# Patient Record
Sex: FEMALE | Race: WHITE | NOT HISPANIC OR LATINO | Employment: UNEMPLOYED | URBAN - METROPOLITAN AREA
[De-identification: names, ages, dates, MRNs, and addresses within clinical notes are randomized per-mention and may not be internally consistent; named-entity substitution may affect disease eponyms.]

---

## 2017-06-12 ENCOUNTER — TELEPHONE (OUTPATIENT)
Dept: FAMILY MEDICINE | Facility: CLINIC | Age: 42
End: 2017-06-12

## 2017-06-12 NOTE — TELEPHONE ENCOUNTER
----- Message from Nabila Velasquez sent at 6/12/2017  2:29 PM CDT -----  Contact: Pt  Pt is a new patient scheduled on 6/29 for an Annual Exam she was wondering can you enter her labs before her initial visit.

## 2017-06-13 NOTE — TELEPHONE ENCOUNTER
The patient was left a detailed message regarding which actual labs she would like to have drawn prior to her appointment.

## 2017-06-13 NOTE — TELEPHONE ENCOUNTER
The patient would like labs entered for an annual, such as checking her cholesterol and blood count.Please enter and the patient will have them drawn at the [facility].

## 2017-06-14 DIAGNOSIS — Z00.00 ANNUAL PHYSICAL EXAM: Primary | ICD-10-CM

## 2017-06-15 ENCOUNTER — PATIENT OUTREACH (OUTPATIENT)
Dept: INTERNAL MEDICINE | Facility: CLINIC | Age: 42
End: 2017-06-15

## 2017-06-19 ENCOUNTER — LAB VISIT (OUTPATIENT)
Dept: LAB | Facility: HOSPITAL | Age: 42
End: 2017-06-19
Attending: FAMILY MEDICINE
Payer: COMMERCIAL

## 2017-06-19 DIAGNOSIS — Z00.00 ANNUAL PHYSICAL EXAM: ICD-10-CM

## 2017-06-19 LAB
ALBUMIN SERPL BCP-MCNC: 3.6 G/DL
ALP SERPL-CCNC: 66 U/L
ALT SERPL W/O P-5'-P-CCNC: 20 U/L
ANION GAP SERPL CALC-SCNC: 8 MMOL/L
AST SERPL-CCNC: 19 U/L
BASOPHILS # BLD AUTO: 0.06 K/UL
BASOPHILS NFR BLD: 0.9 %
BILIRUB SERPL-MCNC: 0.4 MG/DL
BUN SERPL-MCNC: 15 MG/DL
CALCIUM SERPL-MCNC: 9 MG/DL
CHLORIDE SERPL-SCNC: 103 MMOL/L
CHOLEST/HDLC SERPL: 2.3 {RATIO}
CO2 SERPL-SCNC: 25 MMOL/L
CREAT SERPL-MCNC: 0.8 MG/DL
DIFFERENTIAL METHOD: NORMAL
EOSINOPHIL # BLD AUTO: 0.3 K/UL
EOSINOPHIL NFR BLD: 4.1 %
ERYTHROCYTE [DISTWIDTH] IN BLOOD BY AUTOMATED COUNT: 12.7 %
EST. GFR  (AFRICAN AMERICAN): >60 ML/MIN/1.73 M^2
EST. GFR  (NON AFRICAN AMERICAN): >60 ML/MIN/1.73 M^2
GLUCOSE SERPL-MCNC: 87 MG/DL
HCT VFR BLD AUTO: 41.7 %
HDL/CHOLESTEROL RATIO: 43.2 %
HDLC SERPL-MCNC: 125 MG/DL
HDLC SERPL-MCNC: 54 MG/DL
HGB BLD-MCNC: 14.4 G/DL
LDLC SERPL CALC-MCNC: 46.8 MG/DL
LYMPHOCYTES # BLD AUTO: 3 K/UL
LYMPHOCYTES NFR BLD: 45.6 %
MCH RBC QN AUTO: 29.8 PG
MCHC RBC AUTO-ENTMCNC: 34.5 %
MCV RBC AUTO: 86 FL
MONOCYTES # BLD AUTO: 0.5 K/UL
MONOCYTES NFR BLD: 6.8 %
NEUTROPHILS # BLD AUTO: 2.8 K/UL
NEUTROPHILS NFR BLD: 42.3 %
NONHDLC SERPL-MCNC: 71 MG/DL
PLATELET # BLD AUTO: 232 K/UL
PMV BLD AUTO: 10.7 FL
POTASSIUM SERPL-SCNC: 4.1 MMOL/L
PROT SERPL-MCNC: 6.9 G/DL
RBC # BLD AUTO: 4.83 M/UL
SODIUM SERPL-SCNC: 136 MMOL/L
TRIGL SERPL-MCNC: 121 MG/DL
TSH SERPL DL<=0.005 MIU/L-ACNC: 2.29 UIU/ML
WBC # BLD AUTO: 6.58 K/UL

## 2017-06-19 PROCEDURE — 80061 LIPID PANEL: CPT

## 2017-06-19 PROCEDURE — 80053 COMPREHEN METABOLIC PANEL: CPT

## 2017-06-19 PROCEDURE — 36415 COLL VENOUS BLD VENIPUNCTURE: CPT | Mod: PO

## 2017-06-19 PROCEDURE — 84443 ASSAY THYROID STIM HORMONE: CPT

## 2017-06-19 PROCEDURE — 85025 COMPLETE CBC W/AUTO DIFF WBC: CPT

## 2017-06-21 ENCOUNTER — TELEPHONE (OUTPATIENT)
Dept: FAMILY MEDICINE | Facility: CLINIC | Age: 42
End: 2017-06-21

## 2017-06-23 ENCOUNTER — HOSPITAL ENCOUNTER (OUTPATIENT)
Dept: RADIOLOGY | Facility: OTHER | Age: 42
Discharge: HOME | End: 2017-06-23
Attending: OBSTETRICS & GYNECOLOGY
Payer: COMMERCIAL

## 2017-06-23 VITALS — WEIGHT: 146 LBS | HEIGHT: 66 IN | BODY MASS INDEX: 23.46 KG/M2

## 2017-06-23 DIAGNOSIS — Z12.31 VISIT FOR SCREENING MAMMOGRAM: ICD-10-CM

## 2017-06-23 PROCEDURE — 77067 SCR MAMMO BI INCL CAD: CPT | Mod: TC

## 2017-06-23 PROCEDURE — 77063 BREAST TOMOSYNTHESIS BI: CPT | Mod: 26,,, | Performed by: INTERNAL MEDICINE

## 2017-06-23 PROCEDURE — 77067 SCR MAMMO BI INCL CAD: CPT | Mod: 26,,, | Performed by: INTERNAL MEDICINE

## 2017-06-29 ENCOUNTER — OFFICE VISIT (OUTPATIENT)
Dept: FAMILY MEDICINE | Facility: CLINIC | Age: 42
End: 2017-06-29
Attending: FAMILY MEDICINE
Payer: COMMERCIAL

## 2017-06-29 VITALS
BODY MASS INDEX: 23.41 KG/M2 | WEIGHT: 145.63 LBS | SYSTOLIC BLOOD PRESSURE: 124 MMHG | HEIGHT: 66 IN | DIASTOLIC BLOOD PRESSURE: 76 MMHG | RESPIRATION RATE: 16 BRPM | HEART RATE: 61 BPM | OXYGEN SATURATION: 98 %

## 2017-06-29 DIAGNOSIS — Z12.83 SKIN EXAM, SCREENING FOR CANCER: ICD-10-CM

## 2017-06-29 DIAGNOSIS — Z00.00 ANNUAL PHYSICAL EXAM: Primary | ICD-10-CM

## 2017-06-29 LAB
BILIRUB SERPL-MCNC: NEGATIVE MG/DL
BLOOD URINE, POC: NEGATIVE
COLOR, POC UA: YELLOW
GLUCOSE UR QL STRIP: NORMAL
KETONES UR QL STRIP: NEGATIVE
LEUKOCYTE ESTERASE URINE, POC: NEGATIVE
NITRITE, POC UA: NEGATIVE
PH, POC UA: 5
PROTEIN, POC: NEGATIVE
SPECIFIC GRAVITY, POC UA: 1.02
UROBILINOGEN, POC UA: NORMAL

## 2017-06-29 PROCEDURE — 81001 URINALYSIS AUTO W/SCOPE: CPT | Mod: S$GLB,,, | Performed by: FAMILY MEDICINE

## 2017-06-29 PROCEDURE — 99999 PR PBB SHADOW E&M-EST. PATIENT-LVL III: CPT | Mod: PBBFAC,,, | Performed by: FAMILY MEDICINE

## 2017-06-29 PROCEDURE — 99386 PREV VISIT NEW AGE 40-64: CPT | Mod: S$GLB,,, | Performed by: FAMILY MEDICINE

## 2017-06-29 RX ORDER — LEVONORGESTREL AND ETHINYL ESTRADIOL 150-30(84)
KIT ORAL
COMMUNITY
Start: 2017-06-09 | End: 2018-05-28

## 2017-07-05 NOTE — PROGRESS NOTES
Subjective:       Patient ID: Cheyenne Flores is a 41 y.o. female.    Chief Complaint: Annual Exam    HPI   Pt is here for annual exam pt is generally well no sob/cp pt would like derm referral for overall skin check  Review of Systems   Constitutional: Negative for activity change, chills, diaphoresis, fatigue and fever.   HENT: Negative for congestion, ear discharge, ear pain, hearing loss, postnasal drip, rhinorrhea, sinus pressure, sneezing, sore throat, trouble swallowing and voice change.    Eyes: Negative for photophobia, discharge, redness, itching and visual disturbance.   Respiratory: Negative for cough, chest tightness, shortness of breath and wheezing.    Cardiovascular: Negative for chest pain, palpitations and leg swelling.   Gastrointestinal: Negative for abdominal pain, anal bleeding, blood in stool, constipation, diarrhea, nausea, rectal pain and vomiting.   Genitourinary: Negative for dyspareunia, dysuria, flank pain, frequency, hematuria, menstrual problem, pelvic pain, urgency, vaginal bleeding and vaginal discharge.   Musculoskeletal: Negative for arthralgias, back pain, joint swelling and neck pain.   Skin: Negative for color change and rash.   Neurological: Negative for dizziness, speech difficulty, weakness, light-headedness, numbness and headaches.   Hematological: Does not bruise/bleed easily.   Psychiatric/Behavioral: Negative for agitation, confusion, decreased concentration, sleep disturbance and suicidal ideas. The patient is not nervous/anxious.        Objective:      Physical Exam   Constitutional: She appears well-developed and well-nourished.   HENT:   Head: Normocephalic and atraumatic.   Right Ear: External ear normal.   Left Ear: External ear normal.   Nose: Nose normal.   Mouth/Throat: Oropharynx is clear and moist.   Eyes: Conjunctivae and EOM are normal. Pupils are equal, round, and reactive to light. Right eye exhibits no discharge. Left eye exhibits no discharge.  "  Neck: Normal range of motion. Neck supple. No thyromegaly present.   Cardiovascular: Normal rate, regular rhythm, normal heart sounds and intact distal pulses.  Exam reveals no gallop and no friction rub.    No murmur heard.  Pulmonary/Chest: Effort normal and breath sounds normal. She has no wheezes. She has no rales.   Abdominal: Soft. Bowel sounds are normal. She exhibits no distension. There is no tenderness. There is no rebound and no guarding.   Genitourinary:   Genitourinary Comments: declines   Musculoskeletal: Normal range of motion. She exhibits no edema or tenderness.   Lymphadenopathy:     She has no cervical adenopathy.   Neurological: She is alert. No cranial nerve deficit. She exhibits normal muscle tone. Coordination normal.   Skin: Skin is warm and dry. No rash noted. No erythema.   Psychiatric: She has a normal mood and affect. Her behavior is normal. Judgment and thought content normal.       Assessment:       1. Annual physical exam    2. Skin exam, screening for cancer        Plan:     orders cmp lipid tsh cbc urine  Cont meds   low fat diet  Graded exercise  F/u derm  rtc annually and prn    Health maintenance  Pap with gyn  Breast exam with pap  Lipid ordered  Mammogram discussed  Flu shot in fall  Tetanus q 10 years     "This note will not be shared with the patient."   "

## 2017-08-16 ENCOUNTER — PATIENT MESSAGE (OUTPATIENT)
Dept: FAMILY MEDICINE | Facility: CLINIC | Age: 42
End: 2017-08-16

## 2017-09-13 ENCOUNTER — PATIENT MESSAGE (OUTPATIENT)
Dept: FAMILY MEDICINE | Facility: CLINIC | Age: 42
End: 2017-09-13

## 2017-09-14 RX ORDER — FLUTICASONE PROPIONATE 50 MCG
1 SPRAY, SUSPENSION (ML) NASAL DAILY
Qty: 16 G | Refills: 3 | Status: SHIPPED | OUTPATIENT
Start: 2017-09-14

## 2018-01-24 ENCOUNTER — PATIENT MESSAGE (OUTPATIENT)
Dept: FAMILY MEDICINE | Facility: CLINIC | Age: 43
End: 2018-01-24

## 2018-05-07 ENCOUNTER — PATIENT MESSAGE (OUTPATIENT)
Dept: FAMILY MEDICINE | Facility: CLINIC | Age: 43
End: 2018-05-07

## 2018-05-14 ENCOUNTER — HOSPITAL ENCOUNTER (OUTPATIENT)
Dept: RADIOLOGY | Facility: HOSPITAL | Age: 43
Discharge: HOME | End: 2018-05-14
Attending: OBSTETRICS & GYNECOLOGY
Payer: COMMERCIAL

## 2018-05-14 ENCOUNTER — TELEPHONE (OUTPATIENT)
Dept: FAMILY MEDICINE | Facility: CLINIC | Age: 43
End: 2018-05-14

## 2018-05-14 VITALS — HEIGHT: 66 IN | WEIGHT: 145 LBS | BODY MASS INDEX: 23.3 KG/M2

## 2018-05-14 DIAGNOSIS — N63.0 BREAST LUMP: ICD-10-CM

## 2018-05-14 PROCEDURE — 77066 DX MAMMO INCL CAD BI: CPT | Mod: TC,PO

## 2018-05-14 PROCEDURE — 76642 ULTRASOUND BREAST LIMITED: CPT | Mod: TC,PO,RT

## 2018-05-14 PROCEDURE — 77062 BREAST TOMOSYNTHESIS BI: CPT | Mod: 26,,, | Performed by: RADIOLOGY

## 2018-05-14 PROCEDURE — 77066 DX MAMMO INCL CAD BI: CPT | Mod: 26,,, | Performed by: RADIOLOGY

## 2018-05-14 PROCEDURE — 76642 ULTRASOUND BREAST LIMITED: CPT | Mod: 26,RT,, | Performed by: RADIOLOGY

## 2018-05-14 NOTE — TELEPHONE ENCOUNTER
----- Message from Luisito Buchanan sent at 5/14/2018  8:58 AM CDT -----  Contact: patient            Name of Who is Calling: Cheyenne      What is the request in detail: patient is requesting a call back in reference to a femine issue.      Can the clinic reply by [facility]: no      [facility] [phone]

## 2018-05-14 NOTE — TELEPHONE ENCOUNTER
I have spoken to the patient and have scheduled her an appointment to see  for her well women exam.

## 2018-05-15 ENCOUNTER — TELEPHONE (OUTPATIENT)
Dept: FAMILY MEDICINE | Facility: CLINIC | Age: 43
End: 2018-05-15

## 2018-05-15 ENCOUNTER — TELEPHONE (OUTPATIENT)
Dept: SURGERY | Facility: CLINIC | Age: 43
End: 2018-05-15

## 2018-05-15 ENCOUNTER — TELEPHONE (OUTPATIENT)
Dept: RADIOLOGY | Facility: HOSPITAL | Age: 43
End: 2018-05-15

## 2018-05-15 ENCOUNTER — PATIENT MESSAGE (OUTPATIENT)
Dept: FAMILY MEDICINE | Facility: CLINIC | Age: 43
End: 2018-05-15

## 2018-05-15 DIAGNOSIS — R92.8 ABNORMAL MAMMOGRAM: Primary | ICD-10-CM

## 2018-05-15 NOTE — TELEPHONE ENCOUNTER
----- Message from Andrea Murguia LPN sent at 5/15/2018  1:18 PM CDT -----  Regarding: Breast Biopsy  Patient is scheduled for a breast biopsy on 5/17/18. The patient requested if possible I enter the biopsy orders under Dr Marshall.   Please let me know if this will be ok with Dr Marshall.to order the patients breast biopsy under her.   Thank you,   Andrea Murguia LPN  Breast Imaging   [id number]

## 2018-05-15 NOTE — TELEPHONE ENCOUNTER
Spoke with patient. Reviewed breast biopsy procedure and reviewed instructions for breast biopsy. Patient expressed understanding and all questions were answered. Provided patient with my phone number to call for any further concerns or questions.   Patient scheduled breast biopsy at the [facility] on 5/17/18

## 2018-05-15 NOTE — TELEPHONE ENCOUNTER
Received call from patient, who states that she is scheduled for a biopsy on Thursday, and she was interested in finding out how soon she can get in with a breast surgeon regardless of the results. Patient states her ordering provider indicated that she will need a consult no matter what the results are.     Upon review of imaging, noted that mass in breast is a BIRADS 5. Told patient that it is perfectly appropriate to tentatively get her an appointment with the surgeon of her choice, and we can always move it if her results do not come back prior to that date. Patient took down the names of our three breast surgeons, and she has my number. Encouraged her to call me back when she is ready to schedule. Also explained that I will be the person calling her with her results, and that I will be checking frequently after Thursday. Verbalized understanding to all information

## 2018-05-16 ENCOUNTER — TELEPHONE (OUTPATIENT)
Dept: SURGERY | Facility: CLINIC | Age: 43
End: 2018-05-16

## 2018-05-16 NOTE — TELEPHONE ENCOUNTER
Patient called back after reviewing surgeons overnight. She would like to see Dr. Hough whenever possible. Scheduled for one week after her biopsy to give us time for results. Patient and I also briefly discussed her biopsy tomorrow and the process. No questions or concerns at this time.

## 2018-05-17 ENCOUNTER — HOSPITAL ENCOUNTER (OUTPATIENT)
Dept: RADIOLOGY | Facility: HOSPITAL | Age: 43
Discharge: HOME | End: 2018-05-17
Attending: FAMILY MEDICINE
Payer: COMMERCIAL

## 2018-05-17 DIAGNOSIS — R92.8 ABNORMAL MAMMOGRAM: ICD-10-CM

## 2018-05-17 PROCEDURE — 27201068 US BREAST BIOPSY WITH IMAGING 1ST SITE RIGHT: Mod: PO

## 2018-05-17 PROCEDURE — 88360 TUMOR IMMUNOHISTOCHEM/MANUAL: CPT | Mod: 26,,, | Performed by: PATHOLOGY

## 2018-05-17 PROCEDURE — 19083 BX BREAST 1ST LESION US IMAG: CPT | Mod: ,,, | Performed by: RADIOLOGY

## 2018-05-17 PROCEDURE — 77065 DX MAMMO INCL CAD UNI: CPT | Mod: 26,RT,, | Performed by: RADIOLOGY

## 2018-05-17 PROCEDURE — 88305 TISSUE EXAM BY PATHOLOGIST: CPT | Performed by: PATHOLOGY

## 2018-05-17 PROCEDURE — 76642 ULTRASOUND BREAST LIMITED: CPT | Mod: TC,PO,RT

## 2018-05-17 PROCEDURE — 77065 DX MAMMO INCL CAD UNI: CPT | Mod: TC,PO,RT

## 2018-05-17 PROCEDURE — 88305 TISSUE EXAM BY PATHOLOGIST: CPT | Mod: 26,,, | Performed by: PATHOLOGY

## 2018-05-17 PROCEDURE — 76642 ULTRASOUND BREAST LIMITED: CPT | Mod: 26,RT,, | Performed by: RADIOLOGY

## 2018-05-18 ENCOUNTER — OFFICE VISIT (OUTPATIENT)
Dept: FAMILY MEDICINE | Facility: CLINIC | Age: 43
End: 2018-05-18
Attending: FAMILY MEDICINE
Payer: COMMERCIAL

## 2018-05-18 ENCOUNTER — LAB VISIT (OUTPATIENT)
Dept: LAB | Facility: HOSPITAL | Age: 43
End: 2018-05-18
Attending: FAMILY MEDICINE
Payer: COMMERCIAL

## 2018-05-18 ENCOUNTER — TELEPHONE (OUTPATIENT)
Dept: SURGERY | Facility: CLINIC | Age: 43
End: 2018-05-18

## 2018-05-18 DIAGNOSIS — Z01.419 ENCOUNTER FOR GYNECOLOGICAL EXAMINATION WITH PAPANICOLAOU SMEAR OF CERVIX: Primary | ICD-10-CM

## 2018-05-18 DIAGNOSIS — Z01.419 ENCOUNTER FOR GYNECOLOGICAL EXAMINATION WITH PAPANICOLAOU SMEAR OF CERVIX: ICD-10-CM

## 2018-05-18 LAB
ALBUMIN SERPL BCP-MCNC: 3.5 G/DL
ALP SERPL-CCNC: 65 U/L
ALT SERPL W/O P-5'-P-CCNC: 15 U/L
ANION GAP SERPL CALC-SCNC: 9 MMOL/L
AST SERPL-CCNC: 16 U/L
BASOPHILS # BLD AUTO: 0.07 K/UL
BASOPHILS NFR BLD: 1 %
BILIRUB SERPL-MCNC: 0.5 MG/DL
BUN SERPL-MCNC: 12 MG/DL
CALCIUM SERPL-MCNC: 9.1 MG/DL
CHLORIDE SERPL-SCNC: 108 MMOL/L
CHOLEST SERPL-MCNC: 148 MG/DL
CHOLEST/HDLC SERPL: 2.8 {RATIO}
CO2 SERPL-SCNC: 23 MMOL/L
CREAT SERPL-MCNC: 0.8 MG/DL
DIFFERENTIAL METHOD: NORMAL
EOSINOPHIL # BLD AUTO: 0.1 K/UL
EOSINOPHIL NFR BLD: 2 %
ERYTHROCYTE [DISTWIDTH] IN BLOOD BY AUTOMATED COUNT: 12.8 %
EST. GFR  (AFRICAN AMERICAN): >60 ML/MIN/1.73 M^2
EST. GFR  (NON AFRICAN AMERICAN): >60 ML/MIN/1.73 M^2
GLUCOSE SERPL-MCNC: 92 MG/DL
HCT VFR BLD AUTO: 42.3 %
HDLC SERPL-MCNC: 52 MG/DL
HDLC SERPL: 35.1 %
HGB BLD-MCNC: 13.9 G/DL
IMM GRANULOCYTES # BLD AUTO: 0.02 K/UL
IMM GRANULOCYTES NFR BLD AUTO: 0.3 %
LDLC SERPL CALC-MCNC: 77.6 MG/DL
LYMPHOCYTES # BLD AUTO: 1.8 K/UL
LYMPHOCYTES NFR BLD: 26.2 %
MCH RBC QN AUTO: 28.3 PG
MCHC RBC AUTO-ENTMCNC: 32.9 G/DL
MCV RBC AUTO: 86 FL
MONOCYTES # BLD AUTO: 0.5 K/UL
MONOCYTES NFR BLD: 7.2 %
NEUTROPHILS # BLD AUTO: 4.4 K/UL
NEUTROPHILS NFR BLD: 63.3 %
NONHDLC SERPL-MCNC: 96 MG/DL
NRBC BLD-RTO: 0 /100 WBC
PLATELET # BLD AUTO: 270 K/UL
PMV BLD AUTO: 10.2 FL
POTASSIUM SERPL-SCNC: 4.2 MMOL/L
PROT SERPL-MCNC: 7.2 G/DL
RBC # BLD AUTO: 4.91 M/UL
SODIUM SERPL-SCNC: 140 MMOL/L
TRIGL SERPL-MCNC: 92 MG/DL
TSH SERPL DL<=0.005 MIU/L-ACNC: 1.82 UIU/ML
WBC # BLD AUTO: 6.9 K/UL

## 2018-05-18 PROCEDURE — 99396 PREV VISIT EST AGE 40-64: CPT | Mod: S$GLB,,, | Performed by: FAMILY MEDICINE

## 2018-05-18 PROCEDURE — 36415 COLL VENOUS BLD VENIPUNCTURE: CPT | Mod: PO

## 2018-05-18 PROCEDURE — 84443 ASSAY THYROID STIM HORMONE: CPT

## 2018-05-18 PROCEDURE — 80061 LIPID PANEL: CPT

## 2018-05-18 PROCEDURE — 87491 CHLMYD TRACH DNA AMP PROBE: CPT

## 2018-05-18 PROCEDURE — 99999 PR PBB SHADOW E&M-EST. PATIENT-LVL II: CPT | Mod: PBBFAC,,, | Performed by: FAMILY MEDICINE

## 2018-05-18 PROCEDURE — 88175 CYTOPATH C/V AUTO FLUID REDO: CPT

## 2018-05-18 PROCEDURE — 80053 COMPREHEN METABOLIC PANEL: CPT

## 2018-05-18 PROCEDURE — 85025 COMPLETE CBC W/AUTO DIFF WBC: CPT

## 2018-05-18 NOTE — TELEPHONE ENCOUNTER
Patient returned my call, we discussed her pathology result. 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 We dicussed what this means, and that we still need to have her meet with Dr. Hough next. Patient and I discussed the option of moving her appointment up with Dr. Hough to Monday. She is agreeable with this. Told her we will need to get her receptors back, but that I will call Monday morning and try and get them expedited.     Patient and I reviewed her new appt information, emailed her a copy of the path report as well as our patient e-binder. Encouraged her to call me with any questions/concerns. Patient mentioned briefly the possibility of going to [place] for a second opinion. Told her we encouraged this if it's something she wants. I told her I'd have her records and images on disc ready for her appt Monday in case she needs them.

## 2018-05-18 NOTE — PROGRESS NOTES
Subjective:       Patient ID: Cheyenne Flores is a 42 y.o. female.    Chief Complaint: Gynecologic Exam    HPI   Pt is here for wwe she is well no abnl vaginal bleeding no discharge itching or burning no dysuria or hematuria no brbpr   Pt had recent breast biopsy dx with breast ca  Review of Systems   Constitutional: Negative for activity change, chills, diaphoresis, fatigue, fever and unexpected weight change.   HENT: Negative for congestion, ear discharge, ear pain, hearing loss, postnasal drip, rhinorrhea, sinus pressure, sneezing, sore throat, trouble swallowing and voice change.    Eyes: Negative for photophobia, discharge, redness, itching and visual disturbance.   Respiratory: Negative for cough, chest tightness, shortness of breath and wheezing.    Cardiovascular: Negative for chest pain, palpitations and leg swelling.   Gastrointestinal: Negative for abdominal pain, anal bleeding, blood in stool, constipation, diarrhea, nausea, rectal pain and vomiting.   Endocrine: Negative for polydipsia and polyuria.   Genitourinary: Negative for difficulty urinating, dyspareunia, dysuria, flank pain, frequency, hematuria, menstrual problem, pelvic pain, urgency, vaginal bleeding and vaginal discharge.   Musculoskeletal: Negative for arthralgias, back pain, joint swelling and neck pain.   Skin: Negative for color change and rash.   Neurological: Negative for dizziness, speech difficulty, weakness, light-headedness, numbness and headaches.   Hematological: Does not bruise/bleed easily.   Psychiatric/Behavioral: Negative for agitation, confusion, decreased concentration, dysphoric mood, sleep disturbance and suicidal ideas. The patient is not nervous/anxious.        Objective:      Physical Exam   Constitutional: She appears well-developed and well-nourished.   HENT:   Head: Normocephalic and atraumatic.   Right Ear: External ear normal.   Left Ear: External ear normal.   Nose: Nose normal.   Mouth/Throat:  "Oropharynx is clear and moist.   Eyes: Conjunctivae and EOM are normal. Pupils are equal, round, and reactive to light. Right eye exhibits no discharge. Left eye exhibits no discharge.   Neck: Normal range of motion. Neck supple. No thyromegaly present.   Cardiovascular: Normal rate, regular rhythm and intact distal pulses.  Exam reveals no gallop and no friction rub.    Pulmonary/Chest: Effort normal and breath sounds normal. She has no wheezes. She has no rales.   Abdominal: Soft. Bowel sounds are normal. She exhibits no distension. There is no tenderness. There is no rebound and no guarding.   Genitourinary: Vagina normal and uterus normal. No vaginal discharge found.   Genitourinary Comments: nefg v/v urethra/urethral meatus w/o lesions or prolapse normal hair distribution cervix pink no adnexal tenderness or fullness moveable uterus    Breasts symmetric no nipple discharge or overlying skin changes right breast with palpable lesion with overlying bandage s/p pos biopsy    Musculoskeletal: Normal range of motion. She exhibits no edema or tenderness.   Lymphadenopathy:     She has no cervical adenopathy.   Neurological: She is alert. No cranial nerve deficit. She exhibits normal muscle tone. Coordination normal.   Skin: Skin is warm and dry. No rash noted. No erythema.   Psychiatric: She has a normal mood and affect. Her behavior is normal. Judgment and thought content normal.       Assessment:       1. Encounter for gynecological examination with Papanicolaou smear of cervix        Plan:     orders cmp lipid cbc tsh  Cont meds  Low fat diet   graded exercise  rtc annually and prn     Health maintenance  Pap today   Breast exam today  Lipid ordered  Mammogram discussed  Flu in fall  Tetanus q 10 years      "This note will not be shared with the patient."   "

## 2018-05-19 LAB
C TRACH DNA SPEC QL NAA+PROBE: NOT DETECTED
N GONORRHOEA DNA SPEC QL NAA+PROBE: NOT DETECTED

## 2018-05-21 ENCOUNTER — DOCUMENTATION ONLY (OUTPATIENT)
Dept: SURGERY | Facility: CLINIC | Age: 43
End: 2018-05-21

## 2018-05-21 ENCOUNTER — OFFICE VISIT (OUTPATIENT)
Dept: SURGERY | Facility: CLINIC | Age: 43
End: 2018-05-21
Payer: COMMERCIAL

## 2018-05-21 VITALS
WEIGHT: 133.38 LBS | HEIGHT: 66 IN | BODY MASS INDEX: 21.44 KG/M2 | TEMPERATURE: 99 F | HEART RATE: 72 BPM | SYSTOLIC BLOOD PRESSURE: 160 MMHG | DIASTOLIC BLOOD PRESSURE: 80 MMHG

## 2018-05-21 DIAGNOSIS — C50.411 MALIGNANT NEOPLASM OF UPPER-OUTER QUADRANT OF RIGHT BREAST IN FEMALE, ESTROGEN RECEPTOR POSITIVE: Primary | ICD-10-CM

## 2018-05-21 DIAGNOSIS — Z17.0 MALIGNANT NEOPLASM OF UPPER-OUTER QUADRANT OF RIGHT BREAST IN FEMALE, ESTROGEN RECEPTOR POSITIVE: Primary | ICD-10-CM

## 2018-05-21 PROCEDURE — 3008F BODY MASS INDEX DOCD: CPT | Mod: CPTII,S$GLB,, | Performed by: SURGERY

## 2018-05-21 PROCEDURE — 99205 OFFICE O/P NEW HI 60 MIN: CPT | Mod: S$GLB,,, | Performed by: SURGERY

## 2018-05-21 PROCEDURE — 99999 PR PBB SHADOW E&M-EST. PATIENT-LVL III: CPT | Mod: PBBFAC,,, | Performed by: SURGERY

## 2018-05-21 RX ORDER — LORATADINE 10 MG/1
10 TABLET ORAL DAILY PRN
COMMUNITY

## 2018-05-21 NOTE — LETTER
May 25, 2018      Nicolasa Solis MD  [address]  [address]  [facility] [address]           [facility]  [address]  [facility] [address]  Phone: [phone]  Fax: [phone]          Patient: Cheyenne Flores   MR Number: [id number]   YOB: 1975   Date of Visit: 5/21/2018       Dear Dr. Nicolasa Solis:    Thank you for referring Cheyenne Flores to me for evaluation. Attached you will find relevant portions of my assessment and plan of care.    If you have questions, please do not hesitate to call me. I look forward to following Cheyenne Flores along with you.    Sincerely,    Lachelle Hough MD    Enclosure  CC:  No Recipients    If you would like to receive this communication electronically, please contact [email or web address] or [phone] to request more information on [employer] Link access.    For providers and/or their staff who would like to refer a patient to [facility], please contact us through our one-stop-shop provider referral line, [facility], at [phone].    If you feel you have received this communication in error or would no longer like to receive these types of communications, please e-mail [email or web address]

## 2018-05-22 ENCOUNTER — HOSPITAL ENCOUNTER (OUTPATIENT)
Dept: RADIOLOGY | Facility: HOSPITAL | Age: 43
Discharge: HOME | End: 2018-05-22
Attending: SURGERY
Payer: COMMERCIAL

## 2018-05-22 ENCOUNTER — PATIENT MESSAGE (OUTPATIENT)
Dept: FAMILY MEDICINE | Facility: CLINIC | Age: 43
End: 2018-05-22

## 2018-05-22 ENCOUNTER — OFFICE VISIT (OUTPATIENT)
Dept: SURGERY | Facility: CLINIC | Age: 43
End: 2018-05-22
Payer: COMMERCIAL

## 2018-05-22 DIAGNOSIS — C50.911 INFILTRATING DUCTAL CARCINOMA OF BREAST, RIGHT: Primary | ICD-10-CM

## 2018-05-22 DIAGNOSIS — Z71.83 ENCOUNTER FOR NONPROCREATIVE GENETIC COUNSELING: ICD-10-CM

## 2018-05-22 DIAGNOSIS — Z17.0 MALIGNANT NEOPLASM OF UPPER-OUTER QUADRANT OF RIGHT BREAST IN FEMALE, ESTROGEN RECEPTOR POSITIVE: ICD-10-CM

## 2018-05-22 DIAGNOSIS — C50.411 MALIGNANT NEOPLASM OF UPPER-OUTER QUADRANT OF RIGHT BREAST IN FEMALE, ESTROGEN RECEPTOR POSITIVE: ICD-10-CM

## 2018-05-22 PROCEDURE — 0159T MRI BREAST BILATERAL W W/O CONTRAST: CPT | Mod: TC

## 2018-05-22 PROCEDURE — 77059 MRI BREAST BILATERAL W W/O CONTRAST: CPT | Mod: 26,,, | Performed by: RADIOLOGY

## 2018-05-22 PROCEDURE — 25500020 PHARM REV CODE 255: Performed by: SURGERY

## 2018-05-22 PROCEDURE — A9577 INJ MULTIHANCE: HCPCS | Performed by: SURGERY

## 2018-05-22 PROCEDURE — 99203 OFFICE O/P NEW LOW 30 MIN: CPT | Mod: S$GLB,,, | Performed by: SURGERY

## 2018-05-22 PROCEDURE — 0159T MRI BREAST BILATERAL W W/O CONTRAST: CPT | Mod: 26,,, | Performed by: RADIOLOGY

## 2018-05-22 RX ADMIN — GADOBENATE DIMEGLUMINE 13 ML: 529 INJECTION, SOLUTION INTRAVENOUS at 06:05

## 2018-05-22 NOTE — PROGRESS NOTES
Nurse Navigator Note:     Met with patient during her consult with Dr. Hough. Patient and I reviewed the information she discussed with Dr. Hough, including treatment options, diagnosis, and future plans for workup. Patient and I went through the new patient binder, explained some of the information and why it is provided.     Also offered patient consults with our other specialty clinics: Dr. Gomez for gynecological health during treatment, Sara Arauz for physical therapy evaluation, Dr. Calvo for psychological support, and Michelle Finney for nutritional counseling. Explained to patient that all of these support services are completely optional. Discussed that physical therapy is the only service that is recommended pre-op specifically, everything else can be requested at a later time. Patient was given a copy of her appointments, Dr. Hough's card, and my card. Encouraged her to call me if she has any questions or concerns or would like to schedule any additional appointments. Verbalized understanding of all information.

## 2018-05-22 NOTE — PROGRESS NOTES
Mrs Case presents for genetic counseling, referred by Dr Hough. She is a 42 year old female with recent diagnosis of IDC right breast, ER/MN+. See pedigree for full family history which will be scanned into Epic media.  This patient does not have a known hereditary cancer genetic mutation on either side of the family and does not have known [place] ancestry.      We reviewed her medical and family history and discussed the genetics of breast cancer, cancer risks associated with a hereditary predisposition to cancer, and the benefits, risks, and limitations of genetic testing according to current NCCN guidelines.  Discussed sporadic verses family clustering verses hereditary cancer. The patients personal history likely represents a sporadic cancer, however possible de sean case exists and based on current NCCN guidelines genetic testing was discussed. The two genes most strongly associated with early-onset breast cancer are BRCA1 and BRCA2. Mutations in these genes increase the risk for both breast and ovarian cancer in women and breast and early prostate cancer in men, along with an elevated risk of pancreatic cancer and melanoma. Due to significant clinical overlap in hereditary cancer susceptibility genes, a molecular diagnosis of a hereditary cancer condition is necessary to clarify the cancer risks for this patient and multigene testing was discussed based on her history of malignancies reported today. Genetic testing for mutations in the BRCA1 and BRCA2 genes involves the complete sequencing of both BRCA1 and BRCA2, testing for 5 large gene rearrangement mutations in the BRCA1 gene, and the BRACAnalysis Large Rearrangement Test (BISI accounts for 6-10% of all mutations in HBOC). This testing is estimated to identify greater than 92% of mutations in the BRCA1 and BRCA2 genes.      We discussed possible results of genetic testing: positive result would mean that a mutation known to be associated with a  higher risk of cancer was identified; negative result would mean that no mutation known to increase the risk of cancer was identified; variant of uncertain significance (VUS) in which a genetic change was identified in a gene, but it is unclear if this change causes an increase in cancer risk normally associated with mutations in the gene. I advised the patient that her medical management may change based on these results and may include increased surveillance, use of chemoprevention, or prophylactic surgery. A tailored cancer prevention plan and implications of the patients test results for relatives will be reviewed once results are available.      Women who carry mutations in the BRCA genes have a 56%-87% risk to develop breast cancer and up to a 27%-44% risk to develop ovarian cancer during their lifetime. Women who carry a BRCA gene mutation also have a greater chance of developing a second primary breast cancer, up to 40%.  Men who carry a BRCA gene mutation have up to a 6% risk to develop breast cancer and an increased risk for early-onset prostate cancer (diagnosed under age 60).  Mutations in the BRCA2 gene have also been associated with an increased risk other types of cancer in both men and women in some families, most notably pancreatic cancer and melanoma.  We discussed cancer risks vary depending on the tumor suppressor gene in which a mutation arises.      Discussed the cost of testing, various labs which can conduct genetic testing and potential insurance coverage. She desires to proceed with testing, she expressed her understanding of the information discussed today and provided informed consent for testing.          RECOMMENDATIONS:                                                                1. Integrated BRACAnalysis with Adri[facility] through [employer], request to expedite results for surgical decision making.   2. Post test counseling will be provided once results are available  with healthcare management per results, including testing of any additional family members if pathogenic mutation is identified.     Time in counseling 45 min, total time 45 min

## 2018-05-25 ENCOUNTER — TELEPHONE (OUTPATIENT)
Dept: HEMATOLOGY/ONCOLOGY | Facility: CLINIC | Age: 43
End: 2018-05-25

## 2018-05-25 ENCOUNTER — TELEPHONE (OUTPATIENT)
Dept: SURGERY | Facility: CLINIC | Age: 43
End: 2018-05-25

## 2018-05-25 ENCOUNTER — HOSPITAL ENCOUNTER (OUTPATIENT)
Dept: RADIOLOGY | Facility: HOSPITAL | Age: 43
Discharge: HOME | End: 2018-05-25
Attending: SURGERY
Payer: COMMERCIAL

## 2018-05-25 DIAGNOSIS — Z17.0 MALIGNANT NEOPLASM OF UPPER-OUTER QUADRANT OF RIGHT BREAST IN FEMALE, ESTROGEN RECEPTOR POSITIVE: ICD-10-CM

## 2018-05-25 DIAGNOSIS — C50.411 MALIGNANT NEOPLASM OF UPPER-OUTER QUADRANT OF RIGHT BREAST IN FEMALE, ESTROGEN RECEPTOR POSITIVE: ICD-10-CM

## 2018-05-25 LAB — POCT GLUCOSE: 89 MG/DL (ref 70–110)

## 2018-05-25 PROCEDURE — 78815 PET IMAGE W/CT SKULL-THIGH: CPT | Mod: TC

## 2018-05-25 PROCEDURE — A9552 F18 FDG: HCPCS

## 2018-05-25 PROCEDURE — 78815 PET IMAGE W/CT SKULL-THIGH: CPT | Mod: 26,PI,, | Performed by: RADIOLOGY

## 2018-05-25 NOTE — TELEPHONE ENCOUNTER
Called pt to schedule consult with Dr. Carrillo/Dr. Gutierrez (Dr. Hough referring).   Scheduled pt at 2pm on 5/28- informed pt of location/time/date.   PT agreeable and verbalized understanding.

## 2018-05-25 NOTE — PROGRESS NOTES
New Breast Cancer  History and Physical  [facility]  Department of Surgery    REFERRING PROVIDER: Nicolasa Solis MD  [address]  [address]  [place] [address]    CHIEF COMPLAINT: right breast cancer    Subjective:      Cheyenne Flores is a 42 y.o. premenopausal female referred for evaluation of recently diagnosed carcinoma of the right breast. The patient was initially referred for surgical evaluation of a breast lump on exam first noted 2018. Follow-up imaging showed mass at 10 o'clock in the right breast. A ultrasound guided biopsy was performed on 2018 with pathology revealing infiltrating ductal carcinoma of the breast.     Patient does routinely do self breast exams.  Patient has noted a change on breast exam.  Patient denies nipple discharge. Patient denies to previous breast biopsy. Patient denies a personal history of breast cancer.    Findings at that time were the following:   Tumor size: 2.8 cm (on MMG)  Tumor ndgndrndanddndend:nd nd2nd Estrogen Receptor: +   Progesterone Receptor: +   Her-2 pranav: 2+, FISH pending   Lymph node status: clinically negative (abnormal node on ultrasound but cannot biopsy)       GYN History:  Age of menarche was 11.  Patient denies hormonal therapy. Does report OCP. Patient is . Age of first live birth was 37. Patient did breast feed x 2.5 years.    FAMILY History:  none    History reviewed. No pertinent past medical history.  Past Surgical History:   Procedure Laterality Date     SECTION  2013     SECTION  2014    MYOMECTOMY       Current Outpatient Prescriptions on File Prior to Visit   Medication Sig Dispense Refill    DAYSEE 0.15 mg-30 mcg (84)/10 mcg (7) 3MPk       fluticasone (FLONASE) 50 mcg/actuation nasal spray 1 spray by Each Nare route once daily. 16 g 3     No current facility-administered medications on file prior to visit.      Social History     Social History    Marital status:      Spouse  "name: N/A    Number of children: 2    Years of education: N/A     Occupational History    Housewife      Social History Main Topics    Smoking status: Never Smoker    Smokeless tobacco: Never Used    Alcohol use 1.2 oz/week     2 Glasses of wine per week    Drug use: No    Sexual activity: Yes     Partners: Male     Other Topics Concern    Not on file     Social History Narrative    No narrative on file     Family History   Problem Relation Age of Onset    Leukemia Father     Stroke Father     Hypertension Father     Hypertension Paternal Grandmother         Review of Systems  Review of Systems   Constitutional: Negative for fatigue and fever.   HENT: Negative for sore throat and trouble swallowing.    Eyes: Negative for visual disturbance.   Respiratory: Negative for cough and shortness of breath.    Cardiovascular: Negative for chest pain and palpitations.   Gastrointestinal: Negative for abdominal pain, constipation, diarrhea and nausea.   Genitourinary: Negative for difficulty urinating and dysuria.   Musculoskeletal: Negative for arthralgias and back pain.   Neurological: Negative for dizziness, weakness and headaches.   Hematological: Negative for adenopathy.        Objective:   PHYSICAL EXAM:  BP (!) 160/80   Pulse 72   Temp 98.5 °F (36.9 °C) (Oral)   Ht 5' 5.5" (1.664 m)   Wt 60.5 kg (133 lb 6.1 oz)   LMP 03/20/2018 (Within Weeks)   BMI 21.86 kg/m²     Physical Exam   Pulmonary/Chest: She exhibits no tenderness and no deformity. Right breast exhibits no inverted nipple, no mass, no nipple discharge, no skin change and no tenderness. Left breast exhibits mass. Left breast exhibits no inverted nipple, no nipple discharge, no skin change and no tenderness.       Lymphadenopathy:     She has no cervical adenopathy.     She has no axillary adenopathy.        Right: No supraclavicular adenopathy present.        Left: No supraclavicular adenopathy present.         Radiology review: Images " personally reviewed by me in the clinic.       Assessment:      Cheyenne Flores is a 42 y.o. premenopausal female with recently diagnosed carcinoma of the right breast.      Plan:    Options for management were discussed with the patient and her family. We reviewed the existing data noting the equivalency of breast conserving surgery with radiation therapy and mastectomy. We also reviewed the guidelines of the National Comprehensive Cancer Network for Stage 1-2 breast carcinoma. We discussed the need for lumpectomy margins to be negative for carcinoma, the necessity for postoperative radiation therapy after breast conservation in most cases, the possibility of a failed or false negative sentinel lymph node biopsy and the potential need for complete lymphadenectomy for a failed or positive sentinel lymph node biopsy were fully discussed. In the setting of mastectomy, delayed or immediate reconstruction options are available and were discussed.     In the setting of lumpectomy, radiation therapy would be recommended majority of the time.  The duration and treatment side effects were discussed with the patient.  This will coordinated with the radiation oncologist pending final pathology.    We also discussed the role of systemic therapy in the treatment of early stage breast cancer.  We discussed that this is based on tumor biology and jodi status and will be determined based on final pathology.  We discussed that if the cancer is hormone positive, endocrine therapy would be recommended in most cases and its use can reduce the risk of recurrence as well as improve survival. Side effects of treatment were briefly discussed. We also discussed the potential role for chemotherapy based on a number of factors such as tumor phenotype (ER+ vs. triple negative vs. Non2qyo+) and this would be determined in coordination with the medical oncologist.    Will proceed with:  1. Genetic testing given her age  2. MRI  3. PET  scan (given suspicious node that cannot be biopsied)  4. Refer to med onc.  Consider mammaprint given low grade, ER+, may not require chemotherapy.    Patient was educated on breast cancer, receptors, wire localization lumpectomy, mastectomy, sentinel lymph node mapping and biopsy, axillary lymph node dissection, reconstruction, breast prosthesis with post-mastectomy bra and radiation therapy. Patient was given patient information binder including [facility] breast cancer treatment brochure.  All her questions were answered.    Total time spent with the patient: 60 minutes.  50 minutes of face to face consultation and 10 minutes of chart review and coordination of care.

## 2018-05-25 NOTE — TELEPHONE ENCOUNTER
Called patient to discuss need for MRI biopsy. Patient spoke to Dr. Hough last night and discussed the MRI results, it was decided that they would proceed with biopsy in hopes of still being able to have breast conservation. Patient was given instructions on MRI biopsy date/time/location, also informed that she may be asked to come by next week and sign consent with Dr. Cage. Verbalized understanding to all information    Also informed patient of PET results while on the phone

## 2018-05-25 NOTE — TELEPHONE ENCOUNTER
Called patient to let her know her know that her FISH came back negative this afternoon. Told her we would let her consultation with Dr. Gutierrez and Dr. Carrillo occur Monday before deciding if we should send a mammaprint neoadjuvantly. Patient verbalized understanding of all information

## 2018-05-27 NOTE — PROGRESS NOTES
HEMATOLOGY/ONCOLOGY CONSULT    Requesting physician: Dr. Hough    Reason for consult: Breast Cancer    HPI:   Ms. Flores is a 41 y/o F with no significant PMHx referred by Breast Surgeon for management for newly diagnosed early stage right breast cancer. Patient was initially referred for surgical evaluation of a breast lump on exam first noted 2018. Patient does routinely do self breast exams.  Patient has noted a change on breast exam.  Patient denies nipple discharge. Patient denies to previous breast biopsy. Patient denies a personal history of breast cancer. Follow-up imaging showed mass at 10 o'clock in the right breast. Tumor size: 2.8 cm based on the mammogram. A ultrasound guided biopsy was performed on 2018 with pathology revealing infiltrating ductal carcinoma of the breast. Tumor ndgndrndanddndend:nd nd2nd. Estrogen Receptor: +, Progesterone Receptor: +, Her-2 pranav: 2+, FISH negative. Lymph node status: clinically negative (abnormal node on ultrasound but cannot biopsy due close proximity to axillary artery). A PET/CT Scan was done on  which showed primary breast lesion. No definite evidence of metastatic disease.  The right axillary lymph nodes were normal with preserved fatty centers and very low uptake probably reactive. She was seen by Breast Surgeon and had discussion regarding BCS vs mastectomy given her likely early stage disease.     Today she is accompanied by her . Reports mild right arm pain. Otherwise doing well.      Past Surgical History:   Procedure Laterality Date     SECTION  2013     SECTION  2014    MYOMECTOMY       GYN History:  Age of menarche was 11.  Patient denies hormonal therapy. Does report OCP. Patient is . Age of first live birth was 37. Patient did breast feed x 2.5 years.    Allergies:   Patient has no known allergies.      Medications:     Current Outpatient Prescriptions   Medication Sig Dispense Refill    fluticasone  (FLONASE) 50 mcg/actuation nasal spray 1 spray by Each Nare route once daily. 16 g 3    loratadine (CLARITIN) 10 mg tablet Take 10 mg by mouth daily as needed for Allergies.       No current facility-administered medications for this visit.      Social History     Social History    Marital status:      Spouse name: N/A    Number of children: 2    Years of education: N/A     Occupational History    Housewife      Social History Main Topics    Smoking status: Never Smoker    Smokeless tobacco: Never Used    Alcohol use 1.2 oz/week     2 Glasses of wine per week    Drug use: No    Sexual activity: Not Currently     Partners: Male     Birth control/ protection: None     Other Topics Concern    Not on file     Social History Narrative    No narrative on file     Family History   Problem Relation Age of Onset    Leukemia Father     Stroke Father     Hypertension Father     No Known Problems Paternal Grandmother     Parkinsonism Mother     No Known Problems Sister     No Known Problems Brother     No Known Problems Son     No Known Problems Son     No Known Problems Maternal Aunt     No Known Problems Maternal Uncle     No Known Problems Paternal Aunt     No Known Problems Paternal Uncle     No Known Problems Maternal Grandmother     Scleroderma Maternal Grandfather     No Known Problems Paternal Grandfather        Review Of Systems:   Constitutional: Negative for fatigue and fever.   HENT: Negative for sore throat and trouble swallowing.    Eyes: Negative for visual disturbance.   Respiratory: Negative for cough and shortness of breath.    Cardiovascular: Negative for chest pain and palpitations.   Gastrointestinal: Negative for abdominal pain, constipation, diarrhea and nausea.   Genitourinary: Negative for difficulty urinating and dysuria.   Musculoskeletal: right arm/axillary pain.   Neurological: Negative for dizziness, weakness and headaches.   Hematological: Negative for  adenopathy.     Physical Exam:   Performance Status: 0  General: No distress  HEENT: NC, AT, No pallor conjunctivae  Cardio: S1, S2, RRR  Pulm: CTA B/L.   Breast: She exhibits no tenderness and no deformity. Right breast exhibits no inverted nipple, no mass, no nipple discharge, no skin change and no tenderness. Right breast exhibits mass. Left breast exhibits no inverted nipple, no nipple discharge, no skin change and no tenderness.       Lymphadenopathy:     She has no cervical adenopathy.     She has no axillary adenopathy.        Right: No supraclavicular adenopathy present.        Left: No supraclavicular adenopathy present.      Diagnostic Tests:     Mammo Digital Diagnostic Bilat with Tomosynthesis CAD 5/14/18:   US Breast Right Limited 5/14/18:      Findings:  No suspicious finding in the left breast.      In the right breast upper outer quadrant 10 o'clock axis posterior depth, there is an irregular high-density mass with indistinct margins measuring 2.8 cm. This corresponds to the area of palpable concern.      Limited right breast ultrasound:   In the right breast 10 o'clock axis 4 cm from the nipple, there is an irregular hypoechoic mass with indistinct margins measuring 2.1 x 1.5 x 1.6 cm. Associated posterior acoustic shadowing and scattered rim hypervascularity. This corresponds to the mammographic finding and the area of palpable concern.      In the right axilla, there is a single node with abnormal morphology measuring 1.0 x 0.7 cm. This node demonstrates cortical thickening up to 0.5 cm, as well as loss of fatty hilum.      BI-RADS Category:   Overall: 5 - Highly Suggestive of Malignancy      FINAL PATHOLOGIC DIAGNOSIS 5/17/18:   Right breast, mass, core biopsy:  Invasive ductal carcinoma, grade 1 (1,2,1).  Tumor measures 0.6 cm in greatest dimension core biopsy specimen.    Estrogen receptor: Positive; strong nuclear staining over 95% tumor cells.  Progesterone receptor: Positive; strong nuclear  staining in over 95% tumor cells.  HER-2/pranav: Equivocal (Stain score = 2+).   Ki-67: Positive nuclear staining in 13% tumor cells.    HER2, BREAST TUMOR, FISH, TS:  Result Summary: NEGATIVE.  Interpretation:  The tumor cells have no evidence of HER2 gene amplification (per ASCO/CAP guidelines).  Result:  Nuc carly(D17Z1,HER2)x2  The HER2:D17Z1 ratio is 1.05.  Average HER2 signals per cell is 2.0.  Average D17Z1 signals per cell is 1.9.    MRI Breast Bilateral W WO Contrast 18:     Extreme fibroglandular tissue. Mild background parenchymal enhancement.     In the right breast upper outer quadrant 10 o'clock axis posterior depth, irregular heterogeneously enhancing mass with circumscribed margins measures 2.1 x 1.9 cm. This corresponds to biopsy-proven malignancy.      In the right breast lower outer quadrant posterior depth, focal homogeneous non-mass enhancement spans 0.5 cm. This focal abnormal enhancement is located 3.3 cm inferior to the biopsy proven malignancy.      No abnormal skin or nipple enhancement. Abnormal enhancement is well away from the nipple, skin, and chest wall.     No suspicious finding in the left breast.      No internal mammary adenopathy.      The bilateral axillary regions and axillary nodes are excluded from the field of view, limiting this exam.      PET/CT Scan 18:     There is a lateral mid breast primary lesion SUV max 4.39.  There is some low-grade uptake within 2 normal size normal appearing lymph nodes SUV max 1.2.  This is very low uptake and could be benign/reactive.      Assessment:     1. Malignant neoplasm of upper-outer quadrant of right breast in female, estrogen receptor positive       Recommendations:     - discussed with patient about disease condition and management in detail today.  - excellent performance status ECO and no significant comorbidites.   - seen by genetic counselor.   - appears to have early stage disease based on clinical staging. ER/AZ +, Her2  (-).   - prefers to have breast conserving surgery followed by radiation if deemed appropriate candidate.  - given she has one high risk features (> 2 cm tumor size), would recommend sending tissue for mammaprint assay.    - discussion regarding systemic therapy will depend on final pathology, lymph node status, and mammaprint findings.   - will benefit from hormonal therapy in the adjuvant setting.   - noted to have additional focal homogeneous non-mass enhancement spans 0.5 cm. This focal abnormal enhancement is located 3.3 cm inferior to the biopsy proven malignancy.  - plan to have biopsy done on this lesion on 6/4/18.   - follow up with Breast Surgeon for discussion regarding BCS vs Mastectomy.   - will f/u with Medical Oncology Clinic in June for discussion regarding adjuvant therapy.       Tentatively will be scheduled to see Dr. Gutierrez in late June.     Case discussed with Dr. Gutierrez.    Claudio Carrillo MD  Hematology/Oncology Fellow     Attending Note  I have personally taken the history and examined this patient and agree with the fellow's note as stated above.  Discussed prognostic and predictive data with patient  Will pursue Mammaprint based on lesion being > 2cm in size

## 2018-05-28 ENCOUNTER — INITIAL CONSULT (OUTPATIENT)
Dept: HEMATOLOGY/ONCOLOGY | Facility: CLINIC | Age: 43
End: 2018-05-28
Payer: COMMERCIAL

## 2018-05-28 VITALS
HEART RATE: 68 BPM | TEMPERATURE: 98 F | RESPIRATION RATE: 20 BRPM | WEIGHT: 132.94 LBS | HEIGHT: 66 IN | DIASTOLIC BLOOD PRESSURE: 85 MMHG | BODY MASS INDEX: 21.36 KG/M2 | SYSTOLIC BLOOD PRESSURE: 137 MMHG

## 2018-05-28 DIAGNOSIS — Z17.0 MALIGNANT NEOPLASM OF UPPER-OUTER QUADRANT OF RIGHT BREAST IN FEMALE, ESTROGEN RECEPTOR POSITIVE: Primary | ICD-10-CM

## 2018-05-28 DIAGNOSIS — C50.411 MALIGNANT NEOPLASM OF UPPER-OUTER QUADRANT OF RIGHT BREAST IN FEMALE, ESTROGEN RECEPTOR POSITIVE: Primary | ICD-10-CM

## 2018-05-28 PROCEDURE — 99999 PR PBB SHADOW E&M-EST. PATIENT-LVL III: CPT | Mod: PBBFAC,,, | Performed by: INTERNAL MEDICINE

## 2018-05-28 PROCEDURE — 99245 OFF/OP CONSLTJ NEW/EST HI 55: CPT | Mod: S$GLB,,, | Performed by: INTERNAL MEDICINE

## 2018-05-31 ENCOUNTER — DOCUMENTATION ONLY (OUTPATIENT)
Dept: SURGERY | Facility: CLINIC | Age: 43
End: 2018-05-31

## 2018-05-31 ENCOUNTER — TELEPHONE (OUTPATIENT)
Dept: SURGERY | Facility: CLINIC | Age: 43
End: 2018-05-31

## 2018-05-31 NOTE — TELEPHONE ENCOUNTER
Patient called regarding Valium for MRI biopsy on Monday. Per Dr. Hough called in Valium 5mg to take the day of biopsy to the [facility] in her chart. Also spoke to patient about follow up to discuss surgery planning. Per Dr. Hough scheduled patient to see her on Thursday and discuss options. Scheduled at [facility] since afternoon clinic at [facility] was full. Patient verbalized understanding to all information

## 2018-05-31 NOTE — PROGRESS NOTES
Results received from [facility], negative Integrated BRACAnalysis with [facility], patient was phoned and results discussed. Dr Hough will be copied on these results as well

## 2018-06-02 ENCOUNTER — PATIENT MESSAGE (OUTPATIENT)
Dept: SURGERY | Facility: CLINIC | Age: 43
End: 2018-06-02

## 2018-06-04 ENCOUNTER — TELEPHONE (OUTPATIENT)
Dept: SURGERY | Facility: CLINIC | Age: 43
End: 2018-06-04

## 2018-06-04 ENCOUNTER — PATIENT MESSAGE (OUTPATIENT)
Dept: SURGERY | Facility: CLINIC | Age: 43
End: 2018-06-04

## 2018-06-04 ENCOUNTER — HOSPITAL ENCOUNTER (OUTPATIENT)
Dept: RADIOLOGY | Facility: HOSPITAL | Age: 43
Discharge: HOME | End: 2018-06-04
Attending: SURGERY
Payer: COMMERCIAL

## 2018-06-04 DIAGNOSIS — C50.911 MALIGNANT NEOPLASM OF RIGHT FEMALE BREAST, UNSPECIFIED ESTROGEN RECEPTOR STATUS, UNSPECIFIED SITE OF BREAST: Primary | ICD-10-CM

## 2018-06-04 DIAGNOSIS — R92.8 ABNORMAL MRI, BREAST: ICD-10-CM

## 2018-06-04 DIAGNOSIS — R92.8 ABNORMAL MAMMOGRAM: ICD-10-CM

## 2018-06-04 PROCEDURE — 0159T MRI BREAST UNILATERAL W W/O CONTRAST: CPT | Mod: TC

## 2018-06-04 PROCEDURE — 77058 MRI BREAST UNILATERAL W W/O CONTRAST: CPT | Mod: 26,,, | Performed by: RADIOLOGY

## 2018-06-04 PROCEDURE — 0159T MRI BREAST UNILATERAL W W/O CONTRAST: CPT | Mod: 26,,, | Performed by: RADIOLOGY

## 2018-06-04 NOTE — TELEPHONE ENCOUNTER
Returned call regarding scheduling breast surgery, pt has decided on breast conservation surgery of lumpectomy with SEED and sentinel lymph node biopsy, pt has labs from 5/18/18 all wnl, pt requesting sx 6-8-18, all questions answered at this time

## 2018-06-06 ENCOUNTER — HOSPITAL ENCOUNTER (OUTPATIENT)
Dept: RADIOLOGY | Facility: HOSPITAL | Age: 43
Discharge: HOME | End: 2018-06-06
Attending: SURGERY
Payer: COMMERCIAL

## 2018-06-06 DIAGNOSIS — Z17.0 MALIGNANT NEOPLASM OF UPPER-OUTER QUADRANT OF RIGHT BREAST IN FEMALE, ESTROGEN RECEPTOR POSITIVE: Primary | ICD-10-CM

## 2018-06-06 DIAGNOSIS — C50.919 BREAST CANCER: ICD-10-CM

## 2018-06-06 DIAGNOSIS — C50.411 MALIGNANT NEOPLASM OF UPPER-OUTER QUADRANT OF RIGHT BREAST IN FEMALE, ESTROGEN RECEPTOR POSITIVE: Primary | ICD-10-CM

## 2018-06-06 PROCEDURE — 77065 DX MAMMO INCL CAD UNI: CPT | Mod: 26,RT,, | Performed by: RADIOLOGY

## 2018-06-06 PROCEDURE — 19285 PERQ DEV BREAST 1ST US IMAG: CPT | Mod: RT,,, | Performed by: RADIOLOGY

## 2018-06-06 PROCEDURE — A4648 IMPLANTABLE TISSUE MARKER: HCPCS | Mod: PO

## 2018-06-06 PROCEDURE — 77065 DX MAMMO INCL CAD UNI: CPT | Mod: TC,PO,RT

## 2018-06-07 ENCOUNTER — TELEPHONE (OUTPATIENT)
Dept: SURGERY | Facility: CLINIC | Age: 43
End: 2018-06-07

## 2018-06-07 ENCOUNTER — NURSE TRIAGE (OUTPATIENT)
Dept: ADMINISTRATIVE | Facility: CLINIC | Age: 43
End: 2018-06-07

## 2018-06-07 ENCOUNTER — ANESTHESIA EVENT (OUTPATIENT)
Dept: SURGERY | Facility: HOSPITAL | Age: 43
End: 2018-06-07
Payer: COMMERCIAL

## 2018-06-07 NOTE — TELEPHONE ENCOUNTER
Spoke with pt regarding surgery, pt advised to arrive to [facility] at 0730 for 0915 surgery, pt verbalized understanding, pre-op education reinforced, all questions answered at this time, pt given reassurance

## 2018-06-07 NOTE — TELEPHONE ENCOUNTER
Reason for Disposition   Caller has medication question only, adult not sick, and triager answers question    Answer Assessment - Initial Assessment Questions  Pt has headache. Wanted to know if ok to take tylenol tonight as she is having a lumpectomy tomorrow. ( sentinel node localization).    Protocols used: ST MEDICATION QUESTION CALL-A-AH

## 2018-06-08 ENCOUNTER — HOSPITAL ENCOUNTER (OUTPATIENT)
Dept: RADIOLOGY | Facility: HOSPITAL | Age: 43
Discharge: HOME | End: 2018-06-08
Attending: SURGERY | Admitting: SURGERY
Payer: COMMERCIAL

## 2018-06-08 ENCOUNTER — ANESTHESIA (OUTPATIENT)
Dept: SURGERY | Facility: HOSPITAL | Age: 43
End: 2018-06-08
Payer: COMMERCIAL

## 2018-06-08 ENCOUNTER — HOSPITAL ENCOUNTER (OUTPATIENT)
Facility: HOSPITAL | Age: 43
Discharge: HOME | End: 2018-06-08
Attending: SURGERY | Admitting: SURGERY
Payer: COMMERCIAL

## 2018-06-08 ENCOUNTER — SURGERY (OUTPATIENT)
Age: 43
End: 2018-06-08

## 2018-06-08 DIAGNOSIS — C50.911 MALIGNANT NEOPLASM OF RIGHT FEMALE BREAST, UNSPECIFIED ESTROGEN RECEPTOR STATUS, UNSPECIFIED SITE OF BREAST: ICD-10-CM

## 2018-06-08 DIAGNOSIS — C50.919 BREAST CANCER: ICD-10-CM

## 2018-06-08 DIAGNOSIS — C50.911 INFILTRATING DUCTAL CARCINOMA OF BREAST, RIGHT: Primary | ICD-10-CM

## 2018-06-08 LAB
B-HCG UR QL: NEGATIVE
CTP QC/QA: YES

## 2018-06-08 PROCEDURE — 64520 N BLOCK LUMBAR/THORACIC: CPT | Performed by: ANESTHESIOLOGY

## 2018-06-08 PROCEDURE — 63600175 PHARM REV CODE 636 W HCPCS: Performed by: PAIN MEDICINE

## 2018-06-08 PROCEDURE — D9220A PRA ANESTHESIA: Mod: ,,, | Performed by: ANESTHESIOLOGY

## 2018-06-08 PROCEDURE — 63600175 PHARM REV CODE 636 W HCPCS: Mod: JW,JG | Performed by: SURGERY

## 2018-06-08 PROCEDURE — 25000003 PHARM REV CODE 250: Performed by: STUDENT IN AN ORGANIZED HEALTH CARE EDUCATION/TRAINING PROGRAM

## 2018-06-08 PROCEDURE — 37000008 HC ANESTHESIA 1ST 15 MINUTES: Performed by: SURGERY

## 2018-06-08 PROCEDURE — 88307 TISSUE EXAM BY PATHOLOGIST: CPT | Mod: 26,,, | Performed by: PATHOLOGY

## 2018-06-08 PROCEDURE — 88341 IMHCHEM/IMCYTCHM EA ADD ANTB: CPT | Mod: 26,,, | Performed by: PATHOLOGY

## 2018-06-08 PROCEDURE — 19301 PARTIAL MASTECTOMY: CPT | Mod: RT,,, | Performed by: SURGERY

## 2018-06-08 PROCEDURE — 81025 URINE PREGNANCY TEST: CPT | Performed by: SURGERY

## 2018-06-08 PROCEDURE — 63600175 PHARM REV CODE 636 W HCPCS: Performed by: STUDENT IN AN ORGANIZED HEALTH CARE EDUCATION/TRAINING PROGRAM

## 2018-06-08 PROCEDURE — 37000009 HC ANESTHESIA EA ADD 15 MINS: Performed by: SURGERY

## 2018-06-08 PROCEDURE — 88305 TISSUE EXAM BY PATHOLOGIST: CPT | Performed by: PATHOLOGY

## 2018-06-08 PROCEDURE — 71000033 HC RECOVERY, INTIAL HOUR: Performed by: SURGERY

## 2018-06-08 PROCEDURE — 88305 TISSUE EXAM BY PATHOLOGIST: CPT | Mod: 26,,, | Performed by: PATHOLOGY

## 2018-06-08 PROCEDURE — 76098 X-RAY EXAM SURGICAL SPECIMEN: CPT | Mod: TC,PO

## 2018-06-08 PROCEDURE — 71000015 HC POSTOP RECOV 1ST HR: Performed by: SURGERY

## 2018-06-08 PROCEDURE — 25000003 PHARM REV CODE 250: Performed by: PAIN MEDICINE

## 2018-06-08 PROCEDURE — 94761 N-INVAS EAR/PLS OXIMETRY MLT: CPT

## 2018-06-08 PROCEDURE — 25000003 PHARM REV CODE 250: Performed by: SURGERY

## 2018-06-08 PROCEDURE — 88341 IMHCHEM/IMCYTCHM EA ADD ANTB: CPT | Performed by: PATHOLOGY

## 2018-06-08 PROCEDURE — 27000221 HC OXYGEN, UP TO 24 HOURS

## 2018-06-08 PROCEDURE — 38525 BIOPSY/REMOVAL LYMPH NODES: CPT | Mod: 51,RT,, | Performed by: SURGERY

## 2018-06-08 PROCEDURE — A9520 TC99 TILMANOCEPT DIAG 0.5MCI: HCPCS

## 2018-06-08 PROCEDURE — 88342 IMHCHEM/IMCYTCHM 1ST ANTB: CPT | Mod: 26,,, | Performed by: PATHOLOGY

## 2018-06-08 PROCEDURE — 38900 IO MAP OF SENT LYMPH NODE: CPT | Mod: ,,, | Performed by: SURGERY

## 2018-06-08 PROCEDURE — 36000706: Performed by: SURGERY

## 2018-06-08 PROCEDURE — 36000707: Performed by: SURGERY

## 2018-06-08 PROCEDURE — 76098 X-RAY EXAM SURGICAL SPECIMEN: CPT | Mod: 26,,, | Performed by: RADIOLOGY

## 2018-06-08 RX ORDER — HYDROMORPHONE HYDROCHLORIDE 1 MG/ML
0.2 INJECTION, SOLUTION INTRAMUSCULAR; INTRAVENOUS; SUBCUTANEOUS EVERY 5 MIN PRN
Status: DISCONTINUED | OUTPATIENT
Start: 2018-06-08 | End: 2018-06-08 | Stop reason: HOSPADM

## 2018-06-08 RX ORDER — PROPOFOL 10 MG/ML
VIAL (ML) INTRAVENOUS
Status: DISCONTINUED | OUTPATIENT
Start: 2018-06-08 | End: 2018-06-08

## 2018-06-08 RX ORDER — CEFAZOLIN SODIUM 1 G/3ML
2 INJECTION, POWDER, FOR SOLUTION INTRAMUSCULAR; INTRAVENOUS
Status: DISCONTINUED | OUTPATIENT
Start: 2018-06-08 | End: 2018-06-08 | Stop reason: HOSPADM

## 2018-06-08 RX ORDER — OXYCODONE AND ACETAMINOPHEN 5; 325 MG/1; MG/1
2 TABLET ORAL EVERY 4 HOURS PRN
Status: DISCONTINUED | OUTPATIENT
Start: 2018-06-08 | End: 2018-06-08 | Stop reason: HOSPADM

## 2018-06-08 RX ORDER — OXYCODONE AND ACETAMINOPHEN 5; 325 MG/1; MG/1
1-2 TABLET ORAL
Qty: 45 TABLET | Refills: 0 | Status: SHIPPED | OUTPATIENT
Start: 2018-06-08 | End: 2018-07-12

## 2018-06-08 RX ORDER — EPHEDRINE SULFATE 50 MG/ML
INJECTION, SOLUTION INTRAVENOUS
Status: DISCONTINUED | OUTPATIENT
Start: 2018-06-08 | End: 2018-06-08

## 2018-06-08 RX ORDER — CEFAZOLIN SODIUM 1 G/3ML
2 INJECTION, POWDER, FOR SOLUTION INTRAMUSCULAR; INTRAVENOUS
Status: COMPLETED | OUTPATIENT
Start: 2018-06-08 | End: 2018-06-08

## 2018-06-08 RX ORDER — ONDANSETRON 2 MG/ML
INJECTION INTRAMUSCULAR; INTRAVENOUS
Status: DISCONTINUED | OUTPATIENT
Start: 2018-06-08 | End: 2018-06-08

## 2018-06-08 RX ORDER — LIDOCAINE HCL/PF 100 MG/5ML
SYRINGE (ML) INTRAVENOUS
Status: DISCONTINUED | OUTPATIENT
Start: 2018-06-08 | End: 2018-06-08

## 2018-06-08 RX ORDER — ISOSULFAN BLUE 50 MG/5ML
INJECTION, SOLUTION SUBCUTANEOUS
Status: DISCONTINUED | OUTPATIENT
Start: 2018-06-08 | End: 2018-06-08 | Stop reason: HOSPADM

## 2018-06-08 RX ORDER — SODIUM CHLORIDE 0.9 % (FLUSH) 0.9 %
3 SYRINGE (ML) INJECTION
Status: DISCONTINUED | OUTPATIENT
Start: 2018-06-08 | End: 2018-06-08 | Stop reason: HOSPADM

## 2018-06-08 RX ORDER — SODIUM CHLORIDE 9 MG/ML
INJECTION, SOLUTION INTRAVENOUS CONTINUOUS
Status: DISCONTINUED | OUTPATIENT
Start: 2018-06-08 | End: 2018-06-08 | Stop reason: HOSPADM

## 2018-06-08 RX ORDER — PHENYLEPHRINE HYDROCHLORIDE 10 MG/ML
INJECTION INTRAVENOUS
Status: DISCONTINUED | OUTPATIENT
Start: 2018-06-08 | End: 2018-06-08

## 2018-06-08 RX ORDER — FENTANYL CITRATE 50 UG/ML
25 INJECTION, SOLUTION INTRAMUSCULAR; INTRAVENOUS EVERY 5 MIN PRN
Status: DISCONTINUED | OUTPATIENT
Start: 2018-06-08 | End: 2018-06-08 | Stop reason: HOSPADM

## 2018-06-08 RX ORDER — KETAMINE HCL IN 0.9 % NACL 50 MG/5 ML
SYRINGE (ML) INTRAVENOUS
Status: DISCONTINUED | OUTPATIENT
Start: 2018-06-08 | End: 2018-06-08

## 2018-06-08 RX ORDER — LIDOCAINE HYDROCHLORIDE 10 MG/ML
INJECTION, SOLUTION EPIDURAL; INFILTRATION; INTRACAUDAL; PERINEURAL
Status: DISCONTINUED | OUTPATIENT
Start: 2018-06-08 | End: 2018-06-08 | Stop reason: HOSPADM

## 2018-06-08 RX ORDER — PROPOFOL 10 MG/ML
VIAL (ML) INTRAVENOUS CONTINUOUS PRN
Status: DISCONTINUED | OUTPATIENT
Start: 2018-06-08 | End: 2018-06-08

## 2018-06-08 RX ORDER — MIDAZOLAM HYDROCHLORIDE 1 MG/ML
0.5 INJECTION INTRAMUSCULAR; INTRAVENOUS
Status: DISCONTINUED | OUTPATIENT
Start: 2018-06-08 | End: 2018-06-08 | Stop reason: HOSPADM

## 2018-06-08 RX ORDER — ONDANSETRON 2 MG/ML
4 INJECTION INTRAMUSCULAR; INTRAVENOUS DAILY PRN
Status: DISCONTINUED | OUTPATIENT
Start: 2018-06-08 | End: 2018-06-08 | Stop reason: HOSPADM

## 2018-06-08 RX ADMIN — SODIUM CHLORIDE: 0.9 INJECTION, SOLUTION INTRAVENOUS at 09:06

## 2018-06-08 RX ADMIN — EPHEDRINE SULFATE 10 MG: 50 INJECTION, SOLUTION INTRAMUSCULAR; INTRAVENOUS; SUBCUTANEOUS at 10:06

## 2018-06-08 RX ADMIN — LIDOCAINE HYDROCHLORIDE 8 ML: 10 INJECTION, SOLUTION EPIDURAL; INFILTRATION; INTRACAUDAL; PERINEURAL at 10:06

## 2018-06-08 RX ADMIN — MIDAZOLAM HYDROCHLORIDE 2 MG: 1 INJECTION, SOLUTION INTRAMUSCULAR; INTRAVENOUS at 08:06

## 2018-06-08 RX ADMIN — PHENYLEPHRINE HYDROCHLORIDE 100 MCG: 10 INJECTION INTRAVENOUS at 10:06

## 2018-06-08 RX ADMIN — PROPOFOL 25 MG: 10 INJECTION, EMULSION INTRAVENOUS at 11:06

## 2018-06-08 RX ADMIN — LIDOCAINE HYDROCHLORIDE 60 MG: 20 INJECTION, SOLUTION INTRAVENOUS at 09:06

## 2018-06-08 RX ADMIN — FENTANYL CITRATE 50 MCG: 50 INJECTION, SOLUTION INTRAMUSCULAR; INTRAVENOUS at 09:06

## 2018-06-08 RX ADMIN — FENTANYL CITRATE 50 MCG: 50 INJECTION, SOLUTION INTRAMUSCULAR; INTRAVENOUS at 10:06

## 2018-06-08 RX ADMIN — PROPOFOL 30 MG: 10 INJECTION, EMULSION INTRAVENOUS at 09:06

## 2018-06-08 RX ADMIN — ONDANSETRON 4 MG: 2 INJECTION INTRAMUSCULAR; INTRAVENOUS at 11:06

## 2018-06-08 RX ADMIN — Medication 20 MG: at 09:06

## 2018-06-08 RX ADMIN — CEFAZOLIN 2 G: 330 INJECTION, POWDER, FOR SOLUTION INTRAMUSCULAR; INTRAVENOUS at 09:06

## 2018-06-08 RX ADMIN — FENTANYL CITRATE 50 MCG: 50 INJECTION, SOLUTION INTRAMUSCULAR; INTRAVENOUS at 08:06

## 2018-06-08 RX ADMIN — PROPOFOL 100 MCG/KG/MIN: 10 INJECTION, EMULSION INTRAVENOUS at 09:06

## 2018-06-08 RX ADMIN — SODIUM CHLORIDE, SODIUM GLUCONATE, SODIUM ACETATE, POTASSIUM CHLORIDE, MAGNESIUM CHLORIDE, SODIUM PHOSPHATE, DIBASIC, AND POTASSIUM PHOSPHATE: .53; .5; .37; .037; .03; .012; .00082 INJECTION, SOLUTION INTRAVENOUS at 10:06

## 2018-06-08 RX ADMIN — ISOSULFAN BLUE 2.5 ML: 10 INJECTION, SOLUTION SUBCUTANEOUS at 10:06

## 2018-06-08 RX ADMIN — OXYCODONE HYDROCHLORIDE AND ACETAMINOPHEN 2 TABLET: 5; 325 TABLET ORAL at 12:06

## 2018-06-08 NOTE — TRANSFER OF CARE
Anesthesia Transfer of Care Note    Patient: Cheyenne Flores    Procedure(s) Performed: Procedure(s) (LRB):  MASTECTOMY, PARTIAL with SEED RIGHT (consent AM of) 1.5 hr case (Right)  BIOPSY, LYMPH NODE, SENTINEL RIGHT (Right)    Patient location: PACU    Anesthesia Type: general    Transport from OR: Transported from OR on 6-10 L/min O2 by face mask with adequate spontaneous ventilation    Post pain: adequate analgesia    Post assessment: no apparent anesthetic complications    Post vital signs: stable    Level of consciousness: awake    Nausea/Vomiting: no nausea/vomiting    Complications: none          Last vitals:   Visit Vitals  /75   Pulse 92   Temp 36.3 °C (97.3 °F) (Temporal)   Resp 14   SpO2 99%

## 2018-06-08 NOTE — ANESTHESIA PREPROCEDURE EVALUATION
Pre-operative evaluation for Procedure(s) (LRB):  MASTECTOMY, PARTIAL with SEED RIGHT (consent AM of) 1.5 hr case (Right)  BIOPSY, LYMPH NODE, SENTINEL RIGHT (Right)    Cheyenne Flores is a 42 y.o. female with no significant PMH who presents for the above procedure. Mammogram showed a 2.8cm mass at the 10 o'clock position in the right breast. Ultrasound guided biopsy on 18 showed infiltrating ductal carcinoma ER+ OK+ HER2+ FISH-, and PET/CT scan  showed no evidence of metastatic disease.     There is no problem list on file for this patient.      Review of patient's allergies indicates:  No Known Allergies     No current facility-administered medications on file prior to encounter.      Current Outpatient Prescriptions on File Prior to Encounter   Medication Sig Dispense Refill    fluticasone (FLONASE) 50 mcg/actuation nasal spray 1 spray by Each Nare route once daily. 16 g 3    loratadine (CLARITIN) 10 mg tablet Take 10 mg by mouth daily as needed for Allergies.         Past Surgical History:   Procedure Laterality Date     SECTION  2013     SECTION  2014    MYOMECTOMY  2012       Social History     Social History    Marital status:      Spouse name: N/A    Number of children: 2    Years of education: N/A     Occupational History    Housewife      Social History Main Topics    Smoking status: Never Smoker    Smokeless tobacco: Never Used    Alcohol use 1.2 oz/week     2 Glasses of wine per week    Drug use: No    Sexual activity: Not Currently     Partners: Male     Birth control/ protection: None     Other Topics Concern    Not on file     Social History Narrative    No narrative on file         Vital Signs Range (Last 24H):         CBC: No results for input(s): WBC, RBC, HGB, HCT, PLT, MCV, MCH, MCHC in the last 72 hours.    CMP: No results for input(s): NA, K, CL, CO2, BUN, CREATININE, GLU, MG, PHOS, CALCIUM, ALBUMIN, PROT, ALKPHOS, ALT, AST,  BILITOT in the last 72 hours.    INR  No results for input(s): PT, INR, PROTIME, APTT in the last 72 hours.      Anesthesia Evaluation    I have reviewed the Patient Summary Reports.     I have reviewed the Medications.     Review of Systems  Anesthesia Hx:  History of prior surgery of interest to airway management or planning:   Hematology/Oncology:  Hematology Normal      Current/Recent Cancer. Breast right   EENT/Dental:EENT/Dental Normal   Cardiovascular:  Cardiovascular Normal Exercise tolerance: good     Pulmonary:  Pulmonary Normal    Renal/:  Renal/ Normal     Hepatic/GI:  Hepatic/GI Normal    Musculoskeletal:  Musculoskeletal Normal    Neurological:  Neurology Normal    Endocrine:  Endocrine Normal    Dermatological:  Skin Normal    Psych:  Psychiatric Normal           Physical Exam  General:  Well nourished    Airway/Jaw/Neck:  Airway Findings: Mouth Opening: Normal Tongue: Normal  General Airway Assessment: Adult     Eyes/Ears/Nose:  EYES/EARS/NOSE FINDINGS: Normal         Mental Status:  Mental Status Findings:  Cooperative, Alert and Oriented         Anesthesia Plan  Type of Anesthesia, risks & benefits discussed:  Anesthesia Type:  general, MAC, regional  Patient's Preference:   Intra-op Monitoring Plan: standard ASA monitors  Intra-op Monitoring Plan Comments:   Post Op Pain Control Plan:   Post Op Pain Control Plan Comments:   Induction:   IV  Beta Blocker:  Patient is not currently on a Beta-Blocker (No further documentation required).       Informed Consent: Patient understands risks and agrees with Anesthesia plan.  Questions answered. Anesthesia consent signed with patient.  ASA Score: 2     Day of Surgery Review of History & Physical:    H&P update referred to the surgeon.         Ready For Surgery From Anesthesia Perspective.

## 2018-06-08 NOTE — ANESTHESIA PROCEDURE NOTES
Paravertebral Single Injection Block(s)    Patient location during procedure: pre-op   Block not for primary anesthetic.  Reason for block: at surgeon's request and post-op pain management   Post-op Pain Location: right chest pain   Start time: 6/8/2018 8:53 AM  Timeout: 6/8/2018 8:52 AM   End time: 6/8/2018 9:07 AM  Surgery related to: right partial mastectomy   Staffing  Anesthesiologist: ALYX CARRILLO  Resident/CRNA: KAI NORRIS  Performed: resident/CRNA   Preanesthetic Checklist  Completed: patient identified, site marked, surgical consent, pre-op evaluation, timeout performed, IV checked, risks and benefits discussed and monitors and equipment checked  Peripheral Block  Patient position: sitting  Prep: ChloraPrep  Patient monitoring: heart rate, cardiac monitor, continuous pulse ox, continuous capnometry and frequent blood pressure checks  Block type: paravertebral - thoracic  Laterality: right  Injection technique: single shot  Needle  Needle type: Tuohy   Needle gauge: 17 G  Needle length: 3.5 in  Needle localization: anatomical landmarks     Assessment  Injection assessment: negative aspiration and negative parasthesia  Paresthesia pain: none  Heart rate change: no  Slow fractionated injection: yes  Medications:  Bolus administered: 30 mL of 0.5 ropivacaine  Epinephrine added: 3.75 mcg/mL (1/300,000)  Additional Notes  T2 os at 3.5 cm  T4 os at 3.0 cm  VSS.  [facility] RN monitoring vitals throughout procedure.  Patient tolerated procedure well.

## 2018-06-08 NOTE — DISCHARGE SUMMARY
Discharge Note    SUMMARY     Admit Date: 6/8/2018    Discharge Date and Time:  06/08/2018 12:21 PM    Hospital Course (synopsis of major diagnoses, care, treatment, and services provided during the course of the hospital stay): Uneventful outpatient surgery.  Patient was discharged in good condition.    Final Diagnosis: Post-Op Diagnosis Codes:     * Malignant neoplasm of right female breast, unspecified estrogen receptor status, unspecified site of breast [C50.911]    Disposition: Home or Self Care    Follow Up/Patient Instructions:     Medications:  Reconciled Home Medications:      Medication List      START taking these medications    oxyCODONE-acetaminophen 5-325 mg per tablet  Commonly known as:  PERCOCET  Take 1-2 tablets by mouth every 4 to 6 hours as needed for Pain.        CONTINUE taking these medications    fluticasone 50 mcg/actuation nasal spray  Commonly known as:  FLONASE  1 spray by Each Nare route once daily.     loratadine 10 mg tablet  Commonly known as:  CLARITIN  Take 10 mg by mouth daily as needed for Allergies.            Discharge Procedure Orders  Activity as tolerated     Notify your health care provider if you experience any of the following:  increased confusion or weakness     Notify your health care provider if you experience any of the following:  persistent dizziness, light-headedness, or visual disturbances     Notify your health care provider if you experience any of the following:  worsening rash     Notify your health care provider if you experience any of the following:  severe persistent headache     Notify your health care provider if you experience any of the following:  redness, tenderness, or signs of infection (pain, swelling, redness, odor or green/yellow discharge around incision site)     Notify your health care provider if you experience any of the following:  difficulty breathing or increased cough     Notify your health care provider if you experience any of the  following:  severe uncontrolled pain     Notify your health care provider if you experience any of the following:  persistent nausea and vomiting or diarrhea     Notify your health care provider if you experience any of the following:  temperature >100.4       Follow-up Information     Lachelle Hough MD In 2 weeks.    Specialties:  Surgery, Breast Surgery  Why:  For wound re-check  Contact information:  [address]  [facility] [place] [address]  [phone]

## 2018-06-08 NOTE — INTERVAL H&P NOTE
The patient has been examined and the H&P has been reviewed:    I concur with the findings and no changes have occurred since H&P was written.    Anesthesia/Surgery risks, benefits and alternative options discussed and understood by patient/family.          Active Hospital Problems    Diagnosis  POA    Infiltrating ductal carcinoma of breast, right [C50.911]  Yes      Resolved Hospital Problems    Diagnosis Date Resolved POA   No resolved problems to display.

## 2018-06-08 NOTE — H&P (VIEW-ONLY)
New Breast Cancer  History and Physical  [facility]  Department of Surgery    REFERRING PROVIDER: Nicolasa Solis MD  [address]  [address]  [place] [address]    CHIEF COMPLAINT: right breast cancer    Subjective:      Cheyenne Flores is a 42 y.o. premenopausal female referred for evaluation of recently diagnosed carcinoma of the right breast. The patient was initially referred for surgical evaluation of a breast lump on exam first noted 2018. Follow-up imaging showed mass at 10 o'clock in the right breast. A ultrasound guided biopsy was performed on 2018 with pathology revealing infiltrating ductal carcinoma of the breast.     Patient does routinely do self breast exams.  Patient has noted a change on breast exam.  Patient denies nipple discharge. Patient denies to previous breast biopsy. Patient denies a personal history of breast cancer.    Findings at that time were the following:   Tumor size: 2.8 cm (on MMG)  Tumor ndgndrndanddndend:nd nd2nd Estrogen Receptor: +   Progesterone Receptor: +   Her-2 pranav: 2+, FISH pending   Lymph node status: clinically negative (abnormal node on ultrasound but cannot biopsy)       GYN History:  Age of menarche was 11.  Patient denies hormonal therapy. Does report OCP. Patient is . Age of first live birth was 37. Patient did breast feed x 2.5 years.    FAMILY History:  none    History reviewed. No pertinent past medical history.  Past Surgical History:   Procedure Laterality Date     SECTION  2013     SECTION  2014    MYOMECTOMY       Current Outpatient Prescriptions on File Prior to Visit   Medication Sig Dispense Refill    DAYSEE 0.15 mg-30 mcg (84)/10 mcg (7) 3MPk       fluticasone (FLONASE) 50 mcg/actuation nasal spray 1 spray by Each Nare route once daily. 16 g 3     No current facility-administered medications on file prior to visit.      Social History     Social History    Marital status:      Spouse  "name: N/A    Number of children: 2    Years of education: N/A     Occupational History    Housewife      Social History Main Topics    Smoking status: Never Smoker    Smokeless tobacco: Never Used    Alcohol use 1.2 oz/week     2 Glasses of wine per week    Drug use: No    Sexual activity: Yes     Partners: Male     Other Topics Concern    Not on file     Social History Narrative    No narrative on file     Family History   Problem Relation Age of Onset    Leukemia Father     Stroke Father     Hypertension Father     Hypertension Paternal Grandmother         Review of Systems  Review of Systems   Constitutional: Negative for fatigue and fever.   HENT: Negative for sore throat and trouble swallowing.    Eyes: Negative for visual disturbance.   Respiratory: Negative for cough and shortness of breath.    Cardiovascular: Negative for chest pain and palpitations.   Gastrointestinal: Negative for abdominal pain, constipation, diarrhea and nausea.   Genitourinary: Negative for difficulty urinating and dysuria.   Musculoskeletal: Negative for arthralgias and back pain.   Neurological: Negative for dizziness, weakness and headaches.   Hematological: Negative for adenopathy.        Objective:   PHYSICAL EXAM:  BP (!) 160/80   Pulse 72   Temp 98.5 °F (36.9 °C) (Oral)   Ht 5' 5.5" (1.664 m)   Wt 60.5 kg (133 lb 6.1 oz)   LMP 03/20/2018 (Within Weeks)   BMI 21.86 kg/m²     Physical Exam   Pulmonary/Chest: She exhibits no tenderness and no deformity. Right breast exhibits no inverted nipple, no mass, no nipple discharge, no skin change and no tenderness. Left breast exhibits mass. Left breast exhibits no inverted nipple, no nipple discharge, no skin change and no tenderness.       Lymphadenopathy:     She has no cervical adenopathy.     She has no axillary adenopathy.        Right: No supraclavicular adenopathy present.        Left: No supraclavicular adenopathy present.         Radiology review: Images " personally reviewed by me in the clinic.       Assessment:      Cheyenne Flores is a 42 y.o. premenopausal female with recently diagnosed carcinoma of the right breast.      Plan:    Options for management were discussed with the patient and her family. We reviewed the existing data noting the equivalency of breast conserving surgery with radiation therapy and mastectomy. We also reviewed the guidelines of the National Comprehensive Cancer Network for Stage 1-2 breast carcinoma. We discussed the need for lumpectomy margins to be negative for carcinoma, the necessity for postoperative radiation therapy after breast conservation in most cases, the possibility of a failed or false negative sentinel lymph node biopsy and the potential need for complete lymphadenectomy for a failed or positive sentinel lymph node biopsy were fully discussed. In the setting of mastectomy, delayed or immediate reconstruction options are available and were discussed.     In the setting of lumpectomy, radiation therapy would be recommended majority of the time.  The duration and treatment side effects were discussed with the patient.  This will coordinated with the radiation oncologist pending final pathology.    We also discussed the role of systemic therapy in the treatment of early stage breast cancer.  We discussed that this is based on tumor biology and jodi status and will be determined based on final pathology.  We discussed that if the cancer is hormone positive, endocrine therapy would be recommended in most cases and its use can reduce the risk of recurrence as well as improve survival. Side effects of treatment were briefly discussed. We also discussed the potential role for chemotherapy based on a number of factors such as tumor phenotype (ER+ vs. triple negative vs. Xen6tgv+) and this would be determined in coordination with the medical oncologist.    Will proceed with:  1. Genetic testing given her age  2. MRI  3. PET  scan (given suspicious node that cannot be biopsied)  4. Refer to med onc.  Consider mammaprint given low grade, ER+, may not require chemotherapy.    Patient was educated on breast cancer, receptors, wire localization lumpectomy, mastectomy, sentinel lymph node mapping and biopsy, axillary lymph node dissection, reconstruction, breast prosthesis with post-mastectomy bra and radiation therapy. Patient was given patient information binder including [facility] breast cancer treatment brochure.  All her questions were answered.    Total time spent with the patient: 60 minutes.  50 minutes of face to face consultation and 10 minutes of chart review and coordination of care.

## 2018-06-08 NOTE — DISCHARGE INSTRUCTIONS
POSTOPERATIVE INSTRUCTIONS FOLLOWING SENTINEL   LYMPH NODE BIOPSY AND LUMPECTOMY      The following are post-operative instructions that will help you to recover from your surgery.  Please read over these instructions carefully and contact us if we can answer any of your questions or concerns.    Dressing/breast binder (surgi-bra)  A surgical bra may be placed around your chest after your surgery.  If you are given the bra, please wear it as close to 24 hours a day as possible until your post-operative clinic appointment.  If the elastic around the bra irritates your skin, you may wear a soft t-shirt underneath the bra.    You may go without wearing the bra long enough to bath, to launder and dry the bra. If you have fluffy filler placed inside the bra, the filler should be removed whenever the bra is taken off. Please reinsert the fluffy filler, or insert the new soft filler, under the bra when you put the bra back on.  If the bra is extremely uncomfortable, you may wear a supportive sports bra instead after 2 days.    You may shower after 2 days.  Do not take a tub bath and do not soak the surgical site. Please do not remove the white strips of tape (steri-strips) that cover your incision.  They will be removed at your clinic visit.    Activity   You should be able to return to your regular activities 2 to 7 days after your surgery depending on your particular procedure.  However, do not engage in strenuous activities in which you use your upper body such as:  golf, tennis, aerobics, washing windows, raking the yard, mopping, vacuuming, heavy lifting (e.g children) until you are seen for your follow-up appointment in clinic.    Medication for pain  You may find that over the counter pain medications may be sufficient for your pain.  You will be given a prescription for pain medication for more severe pain.  You should not drive or operate machinery while taking these.  Please take narcotics with food.  Narcotics  can cause, or worse, constipation.  You will need to increase your fluid intake, eat high fiber foods (such as fruits and bran) and make sure that you are up and walking. You may need to take an over the counter stool softener for constipation.    Please report the following:     Temperature greater than 101 degrees   Discharge or bad odor from the wound   Excessive bleeding, such as bloody dressing or extreme bruising   Redness at incision and/or drain sites   Swelling or buildup of fluid around incision     If blue dye was used to locate your sentinel lymph nodes, your urine and stool may be blue-green in color for 1 or 2 days.      Additional information  I will see you approximately 2 weeks following your surgery.  If this follow-up appointment has not been made, please call the office.    If you have any questions or problems, please call my office or my nurse.    Dr. Lachelle Albarado, RN  [phone]    After hours and on weekends, you may call the main [facility] line at [phone] and ask to have the general surgery resident paged or have me paged.

## 2018-06-10 ENCOUNTER — NURSE TRIAGE (OUTPATIENT)
Dept: ADMINISTRATIVE | Facility: CLINIC | Age: 43
End: 2018-06-10

## 2018-06-10 NOTE — OP NOTE
DATE OF PROCEDURE: 6/8/2018    SURGEON: Lachelle Hough M.D.    ASSISTANT:   Gera Morelos MD    PREOPERATIVE DIAGNOSIS: Invasive breast carcinoma of the right breast upper outer quadrant    POSTOPERATIVE DIAGNOSIS: same    ANESTHESIA: local, regional and IV sedation    PROCEDURES PERFORMED:   1. right breast seed localization partial mastectomy (lumpectomy) with excision for clear margins   2. right axillary deep sentinel lymph node biopsy   3. injection of right breast with isosulfan Lymphazurin blue dye and technetium-labeled radiocolloid for sentinel lymph node identification  4. Identification of sentinel lymph node       PROCEDURE IN DETAIL:   The patient underwent informed consent.  The seed was placed in the upper outer quadrant of the right breast. The localization films were reviewed.    The right breast was injected in the subareolar region with the technetium-labeled radiocolloid.     She was then brought to the Operating Room and placed in the supine position. Anesthesia was administered.  The right breast was further injected with the isosulfan Lymphazurin blue dye in the central subareolar region. The right breast, anterior chest, arm and axilla were then prepped and draped in a sterile fashion.     Using the gamma probe, activity was noted and localized in the right axilla. Local anesthetic with 1% lidocaine was injected into the skin where the incision will be placed. We made a small transverse inferior axillary incision over the area of activity. We then dissected down through the clavipectoral fascia using electrocautery, identifying a blue afferent lymphatic channel coming from the upper outer quadrant axillary tail of Rojo breast tissue to a level 1 sentinel node, which was excised. It was dissected circumferentially with electrocautery. The lymph node was labeled as specimen #1 for permanent sectioning, hot and blue with an ex vivo count of 495. A total of 2 axillary sentinel lymph nodes were  removed.  LN #2 was identified with ultrasound as the suspicious node on preop workup.  It was removed due to the appearance. It did have any tracer in it.  The probe was placed in the cavity and there was no significant residual background radioactivity or blue dye noted in the axilla indicating adequate and appropriate sentinel lymph node biopsy. The cavity was then palpated and 0 further palpable nodes were noted. Any additional palpable nodes were sent to pathology for permanent section.       We then achieved hemostasis and irrigation was performed.  The incision was then closed with an interuppted 3-0 vicryl deep dermal followed by a running 4-0 monocryl subcuticular.    Next, we turned our attention to the right breast itself. An incision was made in the periareolar position of the right breast. The specimen was dissected circumferentially around the cancer using the seed, ultrasound and probe as a guide.  We did dissect all the way down toand  including the underlying pectoralis fascia. The localization lumpectomy specimen was inked on the back table using green ink inferiorly, blue ink superiorly, orange ink laterally, yellow ink anteriorly, black ink posteriorly, and the red ink medially.  It was then fixed with acetic acid and submitted for specimen radiograph, which confirmed the seed, clip and area of interest within the specimen. Given the appearance and location of the lesion, no additional margins were taken.       Within the lumpectomy cavity, hemostasis was achieved with cautery. The wound was irrigated until clear. There was no evidence of bleeding. It was closed in multiple layers with deep dermal and subcutaneous interrupted Vicryl sutures and a running 4-0 Monocryl subcuticular skin closure.    Dermabond was placed and a post-procedure bra was placed. She tolerated the procedure well without complication and was turned over to Anesthesia for transport to the recovery area in a satisfactory  condition. All specimens were sent to Pathology for permanent sectioning.    ESTIMATED BLOOD LOSS: 5 ml    COMPLICATIONS: none    DISPOSITION:  PACU--hemodynamically stable    ATTESTATION:  I was present and scrubbed for the entire procedure.

## 2018-06-11 VITALS
TEMPERATURE: 98 F | RESPIRATION RATE: 18 BRPM | HEART RATE: 70 BPM | OXYGEN SATURATION: 99 % | SYSTOLIC BLOOD PRESSURE: 120 MMHG | DIASTOLIC BLOOD PRESSURE: 80 MMHG

## 2018-06-18 ENCOUNTER — PATIENT MESSAGE (OUTPATIENT)
Dept: FAMILY MEDICINE | Facility: CLINIC | Age: 43
End: 2018-06-18

## 2018-06-18 ENCOUNTER — PATIENT MESSAGE (OUTPATIENT)
Dept: SURGERY | Facility: CLINIC | Age: 43
End: 2018-06-18

## 2018-06-19 ENCOUNTER — PATIENT MESSAGE (OUTPATIENT)
Dept: SURGERY | Facility: CLINIC | Age: 43
End: 2018-06-19

## 2018-06-19 ENCOUNTER — TELEPHONE (OUTPATIENT)
Dept: HEMATOLOGY/ONCOLOGY | Facility: CLINIC | Age: 43
End: 2018-06-19

## 2018-06-19 ENCOUNTER — CLINICAL SUPPORT (OUTPATIENT)
Dept: REHABILITATION | Facility: HOSPITAL | Age: 43
End: 2018-06-19
Attending: SURGERY
Payer: COMMERCIAL

## 2018-06-19 DIAGNOSIS — M25.611 DECREASED RANGE OF MOTION OF RIGHT SHOULDER: ICD-10-CM

## 2018-06-19 DIAGNOSIS — M79.621 PAIN IN RIGHT UPPER ARM: ICD-10-CM

## 2018-06-19 PROCEDURE — 97161 PT EVAL LOW COMPLEX 20 MIN: CPT | Mod: PO

## 2018-06-19 PROCEDURE — 97110 THERAPEUTIC EXERCISES: CPT | Mod: PO

## 2018-06-19 NOTE — PATIENT INSTRUCTIONS
Copyright © 1551-5914 [employer]      Sidelying Shoulder Abduction    Do this on both sides. Keep your elbow straight. Lift your arm upward toward your head. Perform 10 times. Perform 3 sets.   Perform 1-2 times per day.        Scapular Retraction: Rowing (Eccentric) - Arms - Side (Resistance Band)        Hold end of band in each hand. Pull back until elbows are even with trunk. Keep elbows by sides, thumbs up. Slowly release for 3-5 seconds. Use green resistance band, do 3 sets of 15 reps each 1x day everyday.       Scapular: Retraction in External Rotation    With hands clasped behind head, elbows up, pull elbows back, pinching shoulder blades together. Hold 30 seconds.  Repeat 5 times. Do 1-2 sessions per day.

## 2018-06-19 NOTE — TELEPHONE ENCOUNTER
Returned call to pt.   Pt stated she would like to speak with Dr. Gutierrez regarding questions about mammaprint vs. Biopsy, why decision was changed/why delayed.   Informed pt looked like mammaprint was being sent by Dr. Hough's office, but would have Dr. Gutierrez call back to address any questions/concerns patient has.   Pt verbalized understanding.     Message fwd.           ----- Message from Iwona Torres sent at 6/19/2018  8:04 AM CDT -----  Contact: pt  Pt called to speak with nurse   Callback#[phone]  Thank You  BRITTANY Torres

## 2018-06-19 NOTE — TELEPHONE ENCOUNTER
MD Sara Kim   Caller: Unspecified (Today,  8:17 AM)             Called back   Reviewed mammaprint information

## 2018-06-20 DIAGNOSIS — C50.411 MALIGNANT NEOPLASM OF UPPER-OUTER QUADRANT OF RIGHT BREAST IN FEMALE, ESTROGEN RECEPTOR POSITIVE: Primary | ICD-10-CM

## 2018-06-20 DIAGNOSIS — Z17.0 MALIGNANT NEOPLASM OF UPPER-OUTER QUADRANT OF RIGHT BREAST IN FEMALE, ESTROGEN RECEPTOR POSITIVE: Primary | ICD-10-CM

## 2018-06-20 PROBLEM — M25.611 DECREASED RANGE OF MOTION OF RIGHT SHOULDER: Status: ACTIVE | Noted: 2018-06-20

## 2018-06-20 PROBLEM — M79.621 PAIN IN RIGHT UPPER ARM: Status: ACTIVE | Noted: 2018-06-20

## 2018-06-20 RX ORDER — LORAZEPAM 0.5 MG/1
0.5 TABLET ORAL EVERY 6 HOURS PRN
Qty: 5 TABLET | Refills: 0 | Status: SHIPPED | OUTPATIENT
Start: 2018-06-20 | End: 2018-07-12

## 2018-06-20 NOTE — PLAN OF CARE
[facility]  Physical Therapy Initial Evaluation    Name: Cheyenne Flores  Clinic Number: [id number]    Therapy Diagnosis:   Encounter Diagnoses   Name Primary?    Pain in right upper arm     Decreased range of motion of right shoulder      Physician: Lachelle Hough MD    Physician Orders: PT Eval and Treat   Medical Diagnosis: Malignant neoplasm of upper-outer quadrant of right breast in female, estrogen receptor positive  Evaluation Date: 2018  Authorization Period Expiration: 2018  Plan of Care Certification Period: 2018 - 6 weeks   Visit # / Visits authorized:   Insurance: [employer] [employer] Plus    Time In: 9:00 am  Time Out: 10:15am   Total Billable Time: 75 minutes    Precautions: Standard      History   History of Present Illness: Cheyenne is a 42 y.o. female that presents to  [facility] at the [facility] to establish a physical therapy plan of care s/p R breast surgery.   Surgery: 2017 right partial mastectomy (lumpectomy) with SLNB  Chief complaint: pt states that she is having some soreness in her R breast and axilla, limited use of R UE, pain in her anterior/lateral chest wall, pulling feeling under her axilla and decreased sensation of her upper R arm. She reports not being able to  her children, has very limited functional reach, push pull. She does need some assistance with UE bathing and UE dressing takes more time and sometimes required some help     No past medical history on file.  Cheyenne Flores  has a past surgical history that includes  section (2013);  section (2014); Myomectomy (); Mastectomy, partial (Right, 2018); and Lee lymph node biopsy (Right, 2018).    Cheyenne has a current medication list which includes the following prescription(s): fluticasone, loratadine, lorazepam, and oxycodone-acetaminophen.    Review of patient's  "allergies indicates:  No Known Allergies     Prior Therapy: for trigger finger on L hand  Social History: lives with her  and two children   Occupation: not currently working   Prior Level of Function: independent, taking care of her two small children, independent with ADLs, driving not working   Current Level of Function: impaired functional reach, driving, not able to  her children, requires some assistance with UE dressing and bathing not working     Other Past Medical History: no reported     Patient's Goals: go back to her regular exercise routine (barre class, yoga, pilates) and return to PLOF     Hand dominance: R handed     Subjective   Pt states: "Hello"   Pain: 0/10 on VAS. 4/10 with movement, worst pain is 4/10   Pain location: right breast, axilla and upper arm    Objective   Mental status :oriented    Postural examination/scapula alignment: Rounded shoulder and Slouched posture      Skin Integrity:   Scar Location: R breast, axillary incision   Appearance: clean, dry, no drainage  Signs of infection: No  Drainage:none   Color: NA    Edema: Mild  Location: Upper R arm     Axillary Web Syndrome/Cording:   Location: none observed this session   Degree of Cording: none observed (mild, moderate etc...)   Number of cords present: none    Sensation: impaired R upper posterior arm        Range of Motion:      Shoulder Range of Motion:   Active /Passive ROM Right Left   Flexion 110 175   Abduction 90 175   IR/90deg 20 60   ER/90deg 85 85          Strength: will measure next session     Functional Mobility (Bed mobility, transfers)  Independent     ADL's:  Min A with UE dressing and bathing     Gait Assessment:   - AD used: none  - Assistance: independent  - Distance: community distances     Patient Education Provided   - role of therapy in multi - disciplinary team, goals for therapy  - progressing back into exercise routine  - lymphedema signs and symptoms     Pt has no cultural, educational or " language barriers to learning provided.  Treatment and Instruction of Home Exercise Program    Time In: 10:00 am  Time Out: 10:15am   Total tx time: 15 min     Cheyenne received individual therapeutic exercises to improve postural correction and alignment, stretching and soft tissue mobility, and strengthening for 25minutes including the following: instructed patient on HEP   Butterflies 3 x 10 reps  SL horizontal abduction 3 x 10 reps   Upright rows/scap retraction 3 x 10 reps with blue theraband       Written Home Exercises Provided and Patient Education: Handouts given   See EMR under patient instructions for program given  Pt demonstrated good understanding of the education provided. Patient demo good return demo of skill of exercises.    Assessment   This is a 42 y.o. female referred to outpatient physical therapy and presents with a medical diagnosis of Malignant neoplasm of upper-outer quadrant of right breast in female, estrogen receptor positive breast cancer and was seen today post-operatively to establish PT plan of care for impairments following surgery including: decreased sensation in R UE, pain, decreased ROM of R UE, decreased ability to perform ADLs, and lack of HEP.     Pt instructed in HEP this session and was able to perform all exercises given independently. Pt instructed to follow up with therapist if any concerns arise with program established. Pt will continue to benefit from skilled physical therapy to address the impairments stated in chart below, provide patient/family education and to maximize pt's level of independence in home and community environment     Anticipated barriers to physical therapy: anticipate none      Pt's spiritual, cultural and educational needs considered and pt agreeable to plan of care and goals as stated below:     Medical necessity is demonstrated by the following IMPAIRMENTS/PROMBLEM LIST:  History  Co-morbidities and personal factors that may impact the plan of  care Examination  Body Structures and Functions, activity limitations and participation restrictions that may impact the plan of care    Clinical Presentation   Co-morbidities:   Breast cancer  S/p lumpectomy due to breast cancer         Personal Factors:   no deficits Body Regions:   upper extremities  trunk    Body Systems:   ROM  strength  edema  scar formation             Activity limitations:   Mobility  lifting and carrying objects    Self care  caring for body parts (brushing teeth, shaving, grooming)  dressing    Domestic Life  doing house work (cleaning house, washing dishes, laundry)  assisting others    Interactions/Relationships  no deficits    Life Areas  no deficits    Community and Social Life  no deficits         stable and uncomplicated                      low   low  high Decision Making/ Complexity Score:  low       Goals: Pt agrees with goals set    Short Term goals: 3 weeks  1. Patient will demonstrate 100% understanding of lymphedema risk reduction practices to include self monitoring for lymphedema. (progressing, not met)  2. Patient will demonstrate independence with Home Exercise program established. (progressing, not met)  3. Pt will increase AROM/PROM in shoulder abduction ROM to 140 degrees on right to improve functional reach, carry, push, pull pain free. (progressing, not met)  4. Pt will increase AROM/PROM in shoulder flexion to 140 degrees on right to improve functional reach, carry, push, pull pain free.(progressing, not met)  5. Strength will be assessed and appropriate goals set. (progressing, not met)    Long Term Goals: 6 weeks   1.  Pt will increase AROM/PROM in shoulder flexion to 175 degrees on right to improve ability to independently dress and bathe her UE. (progressing, not met)  2. Pt will increase strength to 4+/5 in gross UE musculature to improve tolerance to all functional activities pain free. (progressing, not met)  3. Pt will demonstrate full/maximized tissue  mobility to increase ROM and promote healthy tissue to be pain free at discharge. (progressing, not met)  4. Pt will report no pain at discharge. (progressing, not met)  5. Pt will increase AROM/PROM in shoulder abduction ROM to 175 degrees on right to improve functional reach, carry, push, pull pain free. (progressing, not met)    Plan   Outpatient physical therapy 1x week for 6 weeks to include the following:   Manual Therapy, Patient Education and Therapeutic Exercise.    Plan of care Certification Period: 6/19/2018 to July 31, 2018.    Therapist: Sara Arauz, PT  6/19/2018

## 2018-06-21 ENCOUNTER — OFFICE VISIT (OUTPATIENT)
Dept: SURGERY | Facility: CLINIC | Age: 43
End: 2018-06-21
Payer: COMMERCIAL

## 2018-06-21 ENCOUNTER — TELEPHONE (OUTPATIENT)
Dept: REHABILITATION | Facility: HOSPITAL | Age: 43
End: 2018-06-21

## 2018-06-21 VITALS
BODY MASS INDEX: 21.99 KG/M2 | DIASTOLIC BLOOD PRESSURE: 82 MMHG | TEMPERATURE: 98 F | WEIGHT: 132 LBS | HEIGHT: 65 IN | HEART RATE: 89 BPM | SYSTOLIC BLOOD PRESSURE: 127 MMHG

## 2018-06-21 DIAGNOSIS — C50.411 MALIGNANT NEOPLASM OF UPPER-OUTER QUADRANT OF RIGHT BREAST IN FEMALE, ESTROGEN RECEPTOR POSITIVE: Primary | ICD-10-CM

## 2018-06-21 DIAGNOSIS — Z17.0 MALIGNANT NEOPLASM OF UPPER-OUTER QUADRANT OF RIGHT BREAST IN FEMALE, ESTROGEN RECEPTOR POSITIVE: Primary | ICD-10-CM

## 2018-06-21 DIAGNOSIS — Z17.0 MALIGNANT NEOPLASM OF RIGHT BREAST IN FEMALE, ESTROGEN RECEPTOR POSITIVE, UNSPECIFIED SITE OF BREAST: Primary | ICD-10-CM

## 2018-06-21 DIAGNOSIS — C50.911 MALIGNANT NEOPLASM OF RIGHT BREAST IN FEMALE, ESTROGEN RECEPTOR POSITIVE, UNSPECIFIED SITE OF BREAST: Primary | ICD-10-CM

## 2018-06-21 PROCEDURE — 99999 PR PBB SHADOW E&M-EST. PATIENT-LVL III: CPT | Mod: PBBFAC,,, | Performed by: SURGERY

## 2018-06-21 PROCEDURE — 99024 POSTOP FOLLOW-UP VISIT: CPT | Mod: S$GLB,,, | Performed by: SURGERY

## 2018-06-21 NOTE — TELEPHONE ENCOUNTER
"Returned pt phone call today regarding axillary incision pain 5/10 and also her concern for "seroma". Pt has a follow up with Dr. Hough today and I reassured her that Dr. Hough would examine her. Advised her to stop her ROM exercises today until after her post op appt. Pt verbalized understanding.   "

## 2018-06-25 NOTE — PROGRESS NOTES
"                                                    Physical Therapy Daily Treatment Note     Name: Cheyenne DORADO [facility]  Clinic Number: [id number]  Diagnosis:   Encounter Diagnoses   Name Primary?    Pain in right upper arm     Decreased range of motion of right shoulder      Physician: Lachelle Hough MD    Precautions: None  Visit #: 2 of 20  Time In: 1:15 PM  Time Out: 2:10 PM  Total Treatment Time 1:1: 55 minutes    Evaluation Date: 6/19/18  Visit # authorized: 20  Authorization period: 12/31/18  Plan of care Expiration: 7/31/18  MD referral: Lachelle Hough MD  Insurance: [employer]      Subjective     Pt reports: feels better and states having pain and tightness right axilla and posterior right upper arm. Patient states she is walking everyday about 2 miles.  Pain Scale: Cheyenne rates pain on a scale of 6/10 on VAS when present  Pain location:  Right axilla    Fatigue: mild at end of day  Functional change: reaching higher with RUE  Treatment: Radiation pending  Surgery date:  6/8/2017 right partial mastectomy (lumpectomy) with SLNB      Objective     Cheyenne received individual therapeutic exercises to improve postural correction and alignment, stretching and soft tissue mobility, and strengthening for 30 minutes including the following:   Supine Shld Flexion with wand     1 x 10  Supine Shld ER with wand            1 x 10  Butterflies                                          10 x 5"  LTR with Butterflies                       1 x 10  Triceps extensions                        1 x 10 2#  Sidelying Shld Abd                            1 x 10  Scap Retractions with BTB              2 x 15  Biceps Curls             2 x 10 2#  Wall walks  Forward                        5 x 5"                    Sideways                        5 x 5'    Cheyenne received the following manual therapy techniques were performed to increased myofascial/soft tissue length, mobility and pliability, increase PROM, AROM and " Patient called to inform Jazmyn that she is having skin cancer removed tomorrow and was given antibiotic to take prior to procedure.   function as well as to decrease pain for 25 minutes to right shoulder, axilla and upper arm:  Bending and stretching for cording right axilla and upper arm  Right anterior and lateral chest stretch  Axillary distraction  Grade II G-H joint mobs right shoulder  Posterior capsule stretch   Passive stretch all righ tshoulder motions        Strength: manual muscle test grades below   Upper Extremity Strength   (R) UE (L) UE   Shoulder flexion: 4/5 5/5   Shoulder Abduction: 4/5 5/5   Shoulder IR 4/5 5/5   Shoulder ER 4/5 5/5   Elbow flexion: 4/5 5/5   Elbow extension: 4/5 5/5   Wrist flexion: 5/5 5/5   Wrist extension: 5/5 5/5    5/5 5/5         Home Exercise Program and Patient Education   Education provided re:  - role of PT in multi - disciplinary team, goals for PT  - progress towards goals     See EMR under notes/patient instructions for HEP given/taught this session - all sets and reps included. Pt received printed copy.     Pt was able to demonstrate and report understanding and performance  Pt has no cultural, educational or language barriers to learning provided.    Assessment     Patient is responding well to physical therapy. Is at expected function and pain level for 3 weeks post operatively.   Pain after treatment: 2/10    Pt prognosis is Excellent. Patient received manual therapy as above. Moderate cording noted right axilla and right upper arm. Patient performed above exercise program and tolerated new exercises provided to improve right shoulder mobility. Patient instructed to continue with HEP and okayed to return to yoga and [place] classes; however, discussed with patient to wean back into these exercises.  Pt will continue to benefit from skilled outpatient physical therapy to address the deficits listed in the problem list chart on initial evaluation, provide pt/family education and to maximize pt's level of independence in the home and community environment.     Goals as follows:  Short Term  goals: 3 weeks  1. Patient will demonstrate 100% understanding of lymphedema risk reduction practices to include self monitoring for lymphedema. (progressing, not met)  2. Patient will demonstrate independence with Home Exercise program established. (progressing, not met)  3. Pt will increase AROM/PROM in shoulder abduction ROM to 140 degrees on right to improve functional reach, carry, push, pull pain free. (progressing, not met)  4. Pt will increase AROM/PROM in shoulder flexion to 140 degrees on right to improve functional reach, carry, push, pull pain free.(progressing, not met)  5. Strength will be 4+/5 for RUE in order to perform functional activities. (progressing, not met)     Long Term Goals: 6 weeks   1.  Pt will increase AROM/PROM in shoulder flexion to 175 degrees on right to improve ability to independently dress and bathe her UE. (progressing, not met)  2. Pt will increase strength to 5/5 in gross UE musculature to improve tolerance to all functional activities pain free. (progressing, not met)  3. Pt will demonstrate full/maximized tissue mobility to increase ROM and promote healthy tissue to be pain free at discharge. (progressing, not met)  4. Pt will report no pain at discharge. (progressing, not met)  5. Pt will increase AROM/PROM in shoulder abduction ROM to 175 degrees on right to improve functional reach, carry, push, pull pain free. (progressing, not met)     Plan   Continue with established Plan of Care towards PT goals.    Therapist: Rebecca Campos, PT  6/27/2018

## 2018-06-27 ENCOUNTER — TELEPHONE (OUTPATIENT)
Dept: SURGERY | Facility: CLINIC | Age: 43
End: 2018-06-27

## 2018-06-27 ENCOUNTER — TELEPHONE (OUTPATIENT)
Dept: HEMATOLOGY/ONCOLOGY | Facility: CLINIC | Age: 43
End: 2018-06-27

## 2018-06-27 ENCOUNTER — CLINICAL SUPPORT (OUTPATIENT)
Dept: REHABILITATION | Facility: HOSPITAL | Age: 43
End: 2018-06-27
Attending: SURGERY
Payer: COMMERCIAL

## 2018-06-27 DIAGNOSIS — M79.621 PAIN IN RIGHT UPPER ARM: ICD-10-CM

## 2018-06-27 DIAGNOSIS — M25.611 DECREASED RANGE OF MOTION OF RIGHT SHOULDER: ICD-10-CM

## 2018-06-27 PROCEDURE — 97140 MANUAL THERAPY 1/> REGIONS: CPT | Mod: PO | Performed by: PHYSICAL MEDICINE & REHABILITATION

## 2018-06-27 PROCEDURE — 97110 THERAPEUTIC EXERCISES: CPT | Mod: PO | Performed by: PHYSICAL MEDICINE & REHABILITATION

## 2018-06-27 NOTE — TELEPHONE ENCOUNTER
Returned call to pt.   Pt stated that she is returning Dr. Gutierrez's call to discuss low risk mammaprint results.   Informed pt would fwd message to Dr. Gutierrez to have her reattempt call.   Pt verbalized understanding.     Message fwd.       ----- Message from Iwona Torres sent at 6/27/2018  9:12 AM CDT -----  Contact: Pt  Patient Returning Call from [facility]    Who Left Message for Patient:Matt Headley  Communication Preference:Phone   Additional Information:  Thank You  BRITTANY Torres

## 2018-06-27 NOTE — PATIENT INSTRUCTIONS
ROM: Flexion - Wand (Supine)        Lie on back holding wand. Raise arms over head.   Repeat _10___ times per set. Do __1__ sets per session. Do _2___ sessions per day.     [email or web address]    ROM: External Rotation - Wand (supine)    Lie on back holding wand with elbows bent to 90°. Rotate forearms over head as far as possible.   Repeat _10___ times per set. Do __1__ sets per session. Do _2___ sessions per day.     [email or web address]     Copyright © [employer]. All rights reserved.             Butterflies      Hands behind your head. Move elbows up and down. Hold for count of 5. Perform 10 times.      Lumbar Rotation    Put your hands  Behind your head--butterfly position..   Feet on floor, slowly rock knees from side to side in small, pain-free range of motion. Allow lower back to rotate slightly, but keep shoulders flat on ground.  Repeat each side. Repeat 10 times per side. Do 1-2 sessions per day.         Sidelying Shoulder Abduction            Sidelying Shoulder Abduction    Lie on your right/left side with right/left arm on top. Keep your elbow straight. Lift your arm upward toward your head.  Perform __10__ times. Perform __1__sets.   Perform __2__ times per day.  Copyright © 0094-5824 [employer]ROM:     Tricep Strength    Lie comfortably on back with a weight in one palm. Bend arm so elbow points up and weight gently hangs. Keep shoulder flat on floor. Raise hand to straighten arm. Repeat with other arm. Use 2 pounds.  Repeat 10 times. Do 1 sessions 1-2x per day.    Scapular Retraction: Rowing (Eccentric) - Arms - Side (Resistance Band)        Hold end of band in each hand. Pull back until elbows are even with trunk. Keep elbows by sides, thumbs up. Slowly release for 3-5 seconds. Use green resistance band, do 1-2 sets of 15 reps each 1x day everyday.         Bilateral Arm Curl    Sit holding 2 lb dumbbell in each hand. Bend elbows.   Repeat 10 times. Do 2 sessions 1-2x per day.        Wall  Climb    Perform this exercise  (2) times a day with ten (10) repetitions.    Stand and FACE THE WALL with your toes 10 to 12 inches away from the wall.    a. Place the fingers of your affected arm on the wall and slowly walk your fingers up the wall. Let your fingers climb the wall as high as possible without feeling pulling or pain.  b. Hold this stretch for 5  seconds then move your fingers back down the wall.  c. Try to go a little higher each time. It may relax you a bit if you rest your head against the wall.    Repeat the above exercises standing with your side to the wall.

## 2018-06-27 NOTE — TELEPHONE ENCOUNTER
Spoke with patient regarding results of mammaprint showing low risk, pt to F/U with medical oncology at this time for further discussion, all questions answered at this time

## 2018-06-28 ENCOUNTER — TELEPHONE (OUTPATIENT)
Dept: HEMATOLOGY/ONCOLOGY | Facility: CLINIC | Age: 43
End: 2018-06-28

## 2018-06-28 NOTE — TELEPHONE ENCOUNTER
----- Message from Fang Gutierrez MD sent at 6/28/2018  4:22 PM CDT -----  Regarding: RE: mammaprint results: low risk   Not until after radiation done    ----- Message -----  From: Sara Gary  Sent: 6/27/2018  12:46 PM  To: Fang Gutierrez MD  Subject: FW: mammaprint results: low risk                 Please advise when needs to come in- thanks!    ----- Message -----  From: Kiera Albarado RN  Sent: 6/27/2018  11:45 AM  To: Hannah Laurent RN, Matt [facility]ACKER Staff  Subject: mammaprint results: low risk                     Good morning,    Mammaprint has resulted, low risk.  Will scan into EPIC.  Please call patient to schedule F/U appointment.  Thank you.    Kiera Albarado RN

## 2018-06-30 NOTE — PROGRESS NOTES
REFERRING PHYSICIAN:  No referring provider defined for this encounter.       Laurie Marshall MD    MEDICAL ONCOLOGIST:    Fang Gutierrez MD    DIAGNOSIS:    This is a 42 y.o. 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    TREATMENT SUMMARY:  The patient is status post right partial mastectomy and sentinel node biopsy on 6/8/2018.  Final pathology showed negative margins, negative nodes.    INTERVAL HISTORY:   Cheyenne Flores comes in for a post-op check.  She denies fever, chills, chest pain or shortness of breath.  Her pain is well controlled.      MEDICATIONS:  Current Outpatient Prescriptions   Medication Sig Dispense Refill    fluticasone (FLONASE) 50 mcg/actuation nasal spray 1 spray by Each Nare route once daily. 16 g 3    loratadine (CLARITIN) 10 mg tablet Take 10 mg by mouth daily as needed for Allergies.      LORazepam (ATIVAN) 0.5 MG tablet Take 1 tablet (0.5 mg total) by mouth every 6 (six) hours as needed for Anxiety. 5 tablet 0    oxyCODONE-acetaminophen (PERCOCET) 5-325 mg per tablet Take 1-2 tablets by mouth every 4 to 6 hours as needed for Pain. 45 tablet 0     No current facility-administered medications for this visit.        ALLERGIES:   Review of patient's allergies indicates:  No Known Allergies    PHYSICAL EXAMINATION:   General:  This is a well appearing female with appropriate speech, affect and gait.     Breast:  Incisions clean, dry, and intact    IMPRESSION:   The patient has had an uneventful postoperative course.    PLAN:   1. return in 6 months for a follow up office visit and breast exam  2. bilateral mammogram in 11 months  3. The patient is advised in continued exam of the breast chest wall and to report to this office sooner should she note any areas of abnormality or concern.   4.  She has been instructed to meet with med onc and rad onc for discussion of adjuvant treatment recommendations  5. Await oncotype

## 2018-07-02 ENCOUNTER — TELEPHONE (OUTPATIENT)
Dept: HEMATOLOGY/ONCOLOGY | Facility: CLINIC | Age: 43
End: 2018-07-02

## 2018-07-02 ENCOUNTER — PATIENT MESSAGE (OUTPATIENT)
Dept: SURGERY | Facility: CLINIC | Age: 43
End: 2018-07-02

## 2018-07-02 NOTE — TELEPHONE ENCOUNTER
Contacted pt to inform that Dr. Gutierrez did not feel necessary pt need to be seen again in office prior to relocation and that per MD messages were sent via patient portal to clarify and discuss reasoning. Pt very upset. Will discuss w/ Dr. Gutierrez    3224 Dr. Gutierrez contacted pt to clear up misunderstandings.

## 2018-07-03 ENCOUNTER — TUMOR BOARD CONFERENCE (OUTPATIENT)
Dept: SURGERY | Facility: CLINIC | Age: 43
End: 2018-07-03

## 2018-07-03 ENCOUNTER — CLINICAL SUPPORT (OUTPATIENT)
Dept: REHABILITATION | Facility: HOSPITAL | Age: 43
End: 2018-07-03
Attending: SURGERY
Payer: COMMERCIAL

## 2018-07-03 ENCOUNTER — OFFICE VISIT (OUTPATIENT)
Dept: RADIATION ONCOLOGY | Facility: CLINIC | Age: 43
End: 2018-07-03
Payer: COMMERCIAL

## 2018-07-03 VITALS
SYSTOLIC BLOOD PRESSURE: 114 MMHG | TEMPERATURE: 98 F | DIASTOLIC BLOOD PRESSURE: 82 MMHG | RESPIRATION RATE: 16 BRPM | HEART RATE: 96 BPM | HEIGHT: 66 IN | BODY MASS INDEX: 21.38 KG/M2 | WEIGHT: 133 LBS

## 2018-07-03 DIAGNOSIS — M79.621 PAIN IN RIGHT UPPER ARM: ICD-10-CM

## 2018-07-03 DIAGNOSIS — M25.611 DECREASED RANGE OF MOTION OF RIGHT SHOULDER: ICD-10-CM

## 2018-07-03 DIAGNOSIS — C50.911 INFILTRATING DUCTAL CARCINOMA OF BREAST, RIGHT: Primary | ICD-10-CM

## 2018-07-03 PROCEDURE — 99999 PR PBB SHADOW E&M-EST. PATIENT-LVL III: CPT | Mod: PBBFAC,,, | Performed by: RADIOLOGY

## 2018-07-03 PROCEDURE — 3008F BODY MASS INDEX DOCD: CPT | Mod: CPTII,S$GLB,, | Performed by: RADIOLOGY

## 2018-07-03 PROCEDURE — 99204 OFFICE O/P NEW MOD 45 MIN: CPT | Mod: S$GLB,,, | Performed by: RADIOLOGY

## 2018-07-03 PROCEDURE — 97140 MANUAL THERAPY 1/> REGIONS: CPT | Mod: PO

## 2018-07-03 PROCEDURE — 97110 THERAPEUTIC EXERCISES: CPT | Mod: PO

## 2018-07-03 NOTE — PROGRESS NOTES
REFERRING PHYSICIAN:   Lachelle Hough M.D.    DIAGNOSIS:    p T1 cN0 M0, stage IA, infiltrating ductal carcinoma of the right breast    HISTORY OF PRESENT ILLNESS:   Ms. Flores is a 42-year-old female who was recently diagnosed with right breast cancer after she presented with a palpable nodule in the upper outer quadrant in May 2018.  A mammogram revealed an irregular hypoechoic mass in the right breast at 10:00 measuring 2.1 x 1.5 x 1.6 cm.  There is also a single node in the right axilla with abnormal morphology measuring 1 x 0.7 cm.  A core needle biopsy of the right breast lesion on May 17, 2018 revealed invasive ductal carcinoma, grade 1, which is ER positive (over 95%), LA positive (over 95%), and HER-2/pranav negative after FISH.  The abnormal lymph node could not be sampled due to the location.  An MRI of the bilateral breasts on May 22, 2018 revealed the right breast lesion at 10:00.  The axillary regions were not visualized.  A PET scan on May 25, 2018 revealed the right breast primary with SUV max of 4.39.  There is some low uptake within 2 normal appearing lymph nodes with SUV max of 1.2.  On June 8, 2018, she underwent lumpectomy and sentinel node biopsy.  Pathology revealed the right breast with 1.6 cm, grade 1, invasive ductal carcinoma with associated DCIS which was negative for EIC.  The closest margin from the invasive component is 2.5 mm posteriorly and from the DCIS is 3 mm posteriorly.  One sentinel lymph node and one additional lymph node were negative for involvement.  She underwent Mammaprint analysis which reveals low risk of recurrence.  She is here today for recommendation regarding further treatment.    At present, patient is healing from the surgery without any unexpected side effects.  She has started physical therapy for the right arm due to decreased range of motion and numbness in the medial aspect, with improvement.  She denies right breast pain, edema, erythema, or nipple  discharge.  She also denies fever, night sweats, or weight loss.  She and her family are moving to [place] for her 's job in early 2018.    REVIEW OF SYSTEMS:  As above.  In addition, patient denies headaches, visual problems, dizziness, chest pain, shortness of breath, cough, nausea, vomiting, diarrhea, or any new bony pains. Patient also denies easy bruising, skin rashes, or numbness or tingling.    GYN HISTORY:   Menarche at age 11.  .  Denies hormonal therapy.    ECO    PAST MEDICAL HISTORY:  No past medical history on file.    PAST SURGICAL HISTORY:  Past Surgical History:   Procedure Laterality Date     SECTION  2013     SECTION  2014    MASTECTOMY, PARTIAL Right 2018    Procedure: MASTECTOMY, PARTIAL with SEED RIGHT (consent AM of) 1.5 hr case;  Surgeon: Lachelle Hough MD;  Location: [facility] OR [address] [place] [address];  Service: General;  Laterality: Right;    MYOMECTOMY  2012    SENTINEL LYMPH NODE BIOPSY Right 2018    Procedure: BIOPSY, LYMPH NODE, SENTINEL RIGHT;  Surgeon: Lachelle Hough MD;  Location: [facility] OR [address] [place] [address];  Service: General;  Laterality: Right;       ALLERGIES:   Review of patient's allergies indicates:  No Known Allergies    MEDICATIONS:  Current Outpatient Prescriptions   Medication Sig    fluticasone (FLONASE) 50 mcg/actuation nasal spray 1 spray by Each Nare route once daily.    loratadine (CLARITIN) 10 mg tablet Take 10 mg by mouth daily as needed for Allergies.    LORazepam (ATIVAN) 0.5 MG tablet Take 1 tablet (0.5 mg total) by mouth every 6 (six) hours as needed for Anxiety.    oxyCODONE-acetaminophen (PERCOCET) 5-325 mg per tablet Take 1-2 tablets by mouth every 4 to 6 hours as needed for Pain.     No current facility-administered medications for this visit.        SOCIAL HISTORY:  Social History     Social History    Marital status:      Spouse name: N/A    Number of children: 2    Years of education: N/A     Occupational  "History    Housewife      Social History Main Topics    Smoking status: Never Smoker    Smokeless tobacco: Never Used    Alcohol use 1.2 oz/week     2 Glasses of wine per week    Drug use: No    Sexual activity: Not Currently     Partners: Male     Birth control/ protection: None     Other Topics Concern    Not on file     Social History Narrative    No narrative on file       FAMILY HISTORY:  Family History   Problem Relation Age of Onset    Leukemia Father     Stroke Father     Hypertension Father     No Known Problems Paternal Grandmother     Parkinsonism Mother     No Known Problems Sister     No Known Problems Brother     No Known Problems Son     No Known Problems Son     No Known Problems Maternal Aunt     No Known Problems Maternal Uncle     No Known Problems Paternal Aunt     No Known Problems Paternal Uncle     No Known Problems Maternal Grandmother     Scleroderma Maternal Grandfather     No Known Problems Paternal Grandfather          PHYSICAL EXAMINATION:  Vitals:    07/03/18 0912   BP: 114/82   Pulse: 96   Resp: 16   Temp: 98.1 °F (36.7 °C)   TempSrc: Oral   Weight: 60.3 kg (133 lb)   Height: 5' 6" (1.676 m)   Body mass index is 21.47 kg/m².  GENERAL: Patient is alert and oriented, in no acute distress.  HEENT:Extraocular muscles are intact.  Oropharynx is clear without lesions.  There is no cervical or supraclavicular lymphadenopathy palpated.  No thyromegaly noted.  HEART: Regular rate and rhythm.  LUNGS: Clear to auscultation bilaterally.  BREAST EXAM: She has small breasts bilaterally.  The scar secondary to lumpectomy is noted in the periareolar region of the right breast.  There is also a scar in the right axilla secondary to sentinel node biopsy.  No abnormal masses palpated in the right breast or right axilla.  The left breast and left axilla are also without palpable masses.  ABDOMEN:Soft, nontender, nondistended, without hepatosplenomegaly.  Normoactive bowel " sounds.  EXTREMITIES: No clubbing, cyanosis, or edema.  NEUROLOGICAL: Cranial nerve II through XII grossly intact.  Sensation is intact.  Strength is 5 out of 5 in the upper and lower extremities bilaterally.     ASSESSMENT:   This is a 42-year-old female with p T1 CN 0 M0, grade 1, stage IA, invasive ductal carcinoma of the right breast who underwent lumpectomy and sentinel node biopsy on June 8, 2018, with a 1.6 cm lesion, which is ER/NC positive and HER-2 negative.    PLAN:   After review of the pathology and radiological images, Ms. Flores is recommended to undergo postoperative radiation to the right breast to reduce her chance of local recurrence.  She will then initiate endocrine therapy per Dr. Gutierrez.  I plan to deliver approximately 4200 cGy +1000 cGy boost.    The risks, benefits, and side effects of radiation were explained in detail to the patient.  All questions were answered and informed consent was signed.  I plan to see the patient back for radiation planning CT during the next week.    Psychosocial Distress screening score of Distress Score: 5 noted and reviewed. No intervention indicated.    I spent approximately 60 minutes reviewing the available records and evaluating the patient, out of which over 50% of the time was spent face to face with the patient in counseling and coordinating this patient's care.

## 2018-07-03 NOTE — PROGRESS NOTES
"                                                    Physical Therapy Daily Treatment Note     Name: Cheyenne DORADO [facility]  Clinic Number: [id number]  Diagnosis:   Encounter Diagnoses   Name Primary?    Pain in right upper arm     Decreased range of motion of right shoulder      Physician: Lachelle Hough MD    Precautions: None  Visit #: 3 of 20  Time In: 3:15 PM  Time Out: 4:15 PM  Total Treatment Time 1:1: 60 minutes    Evaluation Date: 6/19/18  Visit # authorized: 20  Authorization period: 12/31/18  Plan of care Expiration: 7/31/18  MD referral: Lachelle Hough MD  Insurance: [employer]      Subjective     Pt reports: she was sick the last few days and was not able to do her exercises as planned. She states that she had a MD appt today for her L hand pain - she went to a hand specialist and they gave her a cortisone shot. Her pain in her L hand is 5/10. She also reports still pulling and pain in her R UE with terminal ROM. She is still having discomfort in her R tricep/elbow area and feels that it is swollen and she is not able to extend her R elbow fully.  Also sensitive to touch in R axilla down into upper arm.   Pain Scale: Cheyenne rates pain on a scale of 5/10 on VAS when present - pt did not indicate pain level for R UE this session   Pain location:  Left hand    Fatigue: mild at end of day  Functional change: reaching higher with RUE  Treatment: Radiation pending  Surgery date:  6/8/2017 right partial mastectomy (lumpectomy) with SLNB      Objective     Cheyenne received individual therapeutic exercises to improve postural correction and alignment, stretching and soft tissue mobility, and strengthening for 45 minutes including the following:   Supine Shld Flexion with wand     1 x 10  Supine Shld ER with wand            1 x 10  Butterflies                                          10 x 5"  LTR with Butterflies                       1 x 10  Sidelying Shld Abd                            1 x 10  Scap " "Retractions with BTB              2 x 15 (Right hand only due to L hand pain)  Wall slides/shoulder flex/abd          2 x 10 reps       Shoulder Range of Motion:   ACTIVE ROM RIGHT   Flexion 115   Abduction 90       LANDMARK RIGHT UE LEFT UE DIFFERENCE   E + 6" 27 cm 26.5 cm .5 cm   E + 4" 25.5 cm 24.5 cm 1 cm   E + 2" 25 cm 23 cm 2 cm   Elbow 22.5 cm 22 cm .5 cm       Cheyenne received the following manual therapy techniques were performed to increased myofascial/soft tissue length, mobility and pliability, increase PROM, AROM and function as well as to decrease pain for 15 minutes to right shoulder, axilla and upper arm:  Bending and stretching for cording right axilla and upper arm  Right anterior and lateral chest stretch  Axillary distraction  Passive stretch all right shoulder motions      Home Exercise Program and Patient Education   Education provided re:  - role of PT in multi - disciplinary team, goals for PT  - progress towards goals     See EMR under notes/patient instructions for HEP given/taught this session - all sets and reps included. Pt received printed copy.     Pt was able to demonstrate and report understanding and performance  Pt has no cultural, educational or language barriers to learning provided.    Assessment   Pt did not make much progress today with ROM in her R UE -she was unable to do her HEP at home due to being sick. Pt also having issues with L hand. Pt instructed to continue exercise as given in last two HEPs but stop doing tricep extensions due to increase pain in that area. There is some slight swelling in her R arm compared to L with measurements takes this session. Will continue to monitor in future sessions. Pt is displaying evidence or axillary cording however, was too sensitive this session to be able to tolerate much manual therapy. Will continue to progress as tolerated. Pt was instructed to be more consistent with HEP. No goals mets this session     Pt prognosis is " Excellent.   Pt will continue to benefit from skilled outpatient physical therapy to address the deficits listed in the problem list chart on initial evaluation, provide pt/family education and to maximize pt's level of independence in the home and community environment.     Goals as follows:  Short Term goals: 3 weeks  1. Patient will demonstrate 100% understanding of lymphedema risk reduction practices to include self monitoring for lymphedema. (progressing, not met)  2. Patient will demonstrate independence with Home Exercise program established. (progressing, not met)  3. Pt will increase AROM/PROM in shoulder abduction ROM to 140 degrees on right to improve functional reach, carry, push, pull pain free. (progressing, not met)  4. Pt will increase AROM/PROM in shoulder flexion to 140 degrees on right to improve functional reach, carry, push, pull pain free.(progressing, not met)  5. Strength will be 4+/5 for RUE in order to perform functional activities. (progressing, not met)     Long Term Goals: 6 weeks   1.  Pt will increase AROM/PROM in shoulder flexion to 175 degrees on right to improve ability to independently dress and bathe her UE. (progressing, not met)  2. Pt will increase strength to 5/5 in gross UE musculature to improve tolerance to all functional activities pain free. (progressing, not met)  3. Pt will demonstrate full/maximized tissue mobility to increase ROM and promote healthy tissue to be pain free at discharge. (progressing, not met)  4. Pt will report no pain at discharge. (progressing, not met)  5. Pt will increase AROM/PROM in shoulder abduction ROM to 175 degrees on right to improve functional reach, carry, push, pull pain free. (progressing, not met)     Plan   Continue with established Plan of Care towards PT goals.    Therapist: Sara Arauz, PT  7/3/2018

## 2018-07-03 NOTE — LETTER
July 3, 2018      Lachelle Hough MD  [address]  [facility] [place] [address]           [facility]  [address]  [facility] [address]  Phone: [phone]          Patient: Cheyenne Flores   MR Number: [id number]   YOB: 1975   Date of Visit: 7/3/2018       Dear Dr. Lachelle Hough:    Thank you for referring Cheyenne Flores to me for evaluation. Attached you will find relevant portions of my assessment and plan of care.    If you have questions, please do not hesitate to call me. I look forward to following Cheyenne Flores along with you.    Sincerely,    Evi Martinez MD    Enclosure  CC:  No Recipients    If you would like to receive this communication electronically, please contact [email or web address] or [phone] to request more information on [employer] access.    For providers and/or their staff who would like to refer a patient to [facility], please contact us through our one-stop-shop provider referral line, [facility], at [phone].    If you feel you have received this communication in error or would no longer like to receive these types of communications, please e-mail [email or web address]

## 2018-07-03 NOTE — PATIENT INSTRUCTIONS
Radiation Therapy Treatment  Radiation therapy can help you in your fight against cancer. It begins with a session to discuss treatment with your doctor. If you and your doctor decide on radiation, you will return for a simulation. The simulation is a planning session that helps the doctor target your cancer. He or she will design a radiation plan to protect normal tissues. When the simulation and plan are completed, you will begin your daily treatments. Treatment is usually once daily Monday to Friday. It takes less than a half an hour. Sometimes you may need radiation twice a day, with usually 6 hours between treatments. After the course of radiation is complete, you will be scheduled for follow-up appointments. This is to make sure the cancer is under control. The follow-ups will also make sure that any side effects from the treatment are taken care of.  Radiation therapy uses high-energy X-rays to kill cancer cells.   Your treatment planning visit: The simulation  Your radiation therapy team uses a special machine called a simulator to map out your treatment. The simulator is usually an X-ray machine (fluoroscopy), CT scanner, MRI scanner, or PET-CT scanner machine. Laser lights act as guides to help position your body accurately. During this visit:  · The team figures out the best position for your body. They make notes in your chart so youll be placed the same way each time.  · You may use special devices to keep your body correctly positioned and still during treatment. These may include molds, masks, rests, and blocks.  · The team makes ink marks on your skin. These will help you get in the same position for each treatment. Tiny permanent tattoos may also be used.   · Markers such as metal balls or wires may be put on or in your body. Sometime these are taped to the skin to help with the imaging process. These work with the X-rays to position your body. The markers are removed when the visit is  over.  After the team has the imaging and data, the information is sent into the computer planning system. Your doctor and the team of physicists and dosimetrists design a treatment field. The field will best target your cancer and how it might spread. It will also help limit radiation to nearby normal tissues.  Your treatments  Each treatment usually takes 10 to 30 minutes. You may need to change into a hospital gown. The radiation therapist puts you in the correct position on the treatment table, then leaves the room. Sometimes you may need more imaging before each treatment. The machine may take digital X-rays or a CT scan to help make sure you are lined up correctly. During treatment, lie as still as you can and breathe normally. You will hear noises coming from the machine. You can talk to the radiation therapist, who watches you from the control room on a TV monitor. After treatment, the therapist will help you off the table. You can then get dressed and go back to your normal activities.  Date Last Reviewed: 1/14/2016 © 2000-2017 [employer]. [address] [place] [address]. All rights reserved. This information is not intended as a substitute for professional medical care. Always follow your healthcare professional's instructions.        Discharge Instructions for Radiation Therapy  Radiation therapy uses high-energy X-rays to kill cancer cells and help you in your fight against cancer. Radiation destroys cancer cells gradually, over time. The goal of therapy is to focus on and kill as many cancer cells as possible. Radiation can also damage or kill some of the normal cells that are closest to the tumor. Damaged normal cells can repair themselves, often within a few days.  Caring for your skin  You should ask your healthcare provider for specific products that he or she recommends for washing and bathing. In general, use a mild nondetergent soap and warm (not hot) water to clean the  "area receiving radiation. Pat the region dry rather than rubbing.  Your healthcare provider may give you products to moisturize the skin and prevent infection. The goal is to prevent cracks or breaks in the skin that may be sensitive from treatment:   · Dont be surprised if your treatment causes skin redness, and a type of "sunburn" over time. Some radiation treatments can cause this.   · Ask your therapy team what lotion to use. Also ask for directions about when and how to apply it.  · Avoid prolonged or direct sunlight on the treated area. Ask your therapy team about using a sunscreen. You do not have to avoid going outside altogether, but must take appropriate precautions.  · Dont remove ink marks unless your radiation therapist says its OK. Dont scrub or use soap on the marks when you wash. Let water run over them and pat them dry.  · Protect your skin from heat or cold. Avoid hot tubs, saunas, heating pads, or ice packs.  · Avoid clothing that causes friction or rubbing on the skin.  Fighting fatigue  Radiation therapy may cause you to feel tired. Your body is working hard to heal and repair itself. To feel better, try these things:  · Do light exercise each day. Take short walks.  · Plan tasks for the times when you tend to have the most energy. Ask for help when you need it.  · Relax before you go to bed. This will help you sleep better. Try reading or listening to soothing music.  Coping with appetite changes  Here are ways to cope:  · Tell your therapy team if you find it hard to eat or you have no appetite. You may be referred to a nutritionist to help you with meal planning.  · Radiation to certain internal sites can cause nausea, depending on the location of treatment. This can affect your appetite. Think of healthy eating as part of your treatment. Try these tips:  ¨ Eat slowly.  ¨ Eat small meals several times a day.  ¨ Eat more food when youre feeling better.  ¨ Ask others to keep you company " when you eat.  ¨ Stock up on easy-to-prepare foods.  ¨ Eat foods high in protein and calories. Your healthcare provider may recommend liquid meal supplements.  ¨ Drink plenty of water and other fluids.  ¨ Ask your healthcare provider before taking any vitamins or over-the-counter supplements. Such products are not regulated by the FDA and can sometimes interfere with your treatments.   Dealing with other side effects  Here are suggestions to deal with other side effects:   · Be prepared for hair loss in the area being treated. The hair loss may be permanent. Be sure to discuss this with your healthcare provider.  · Sip cool water if your mouth or throat becomes dry or sore. Ice chips may also help.  · Tell your healthcare provider if you have diarrhea or constipation. You may be given a special diet.  · If you have trouble swallowing liquids, tell your healthcare provider.  Follow-up  Make a follow-up appointment as directed by your healthcare provider.     When to call your healthcare provider  Call your healthcare provider right away if you have any of the following:  · Unexpected headaches  · Trouble concentrating  · Ongoing fatigue  · Wheezing, shortness of breath, or trouble breathing  · Pain that doesnt go away, especially if its always in the same place  · New or unusual lumps, bumps, or swelling  · Dizziness or lightheadedness  · Unusual rashes, bruises, or bleeding  · Fever of 100.4°F (38°C) or higher, or chills  · Nausea and vomiting  · Diarrhea that doesnt improve with time  · Skin breakdown; significant pain due to skin irritation   Date Last Reviewed: 1/13/2016  © 0154-4548 [employer]. [address] [place] [address], [place] [address]. All rights reserved. This information is not intended as a substitute for professional medical care. Always follow your healthcare professional's instructions.        Radiation Therapy: Managing Short-Term Side Effects     Take short walks daily.   Radiation  therapy uses high-energy X-rays or particles to kill cancer cells. Some normal cells can also be affected. This causes side effects such as dry skin, tiredness (fatigue), or changes in your appetite. Most side effects go away when your radiation therapy is over.  Having side effects of radiation therapy does not mean that your cancer is getting worse or that therapy isnt working.   Caring for your skin  Skin problems may happen where your body gets radiation. Your skin may become dry, itchy, red, and peeling. It may darken in that spot, like a tan. To care for your skin:  · Dont scrub on the treatment area. Clean that area of the skin every day. Use warm water and mild soap, or as your healthcare provider advises. Pat the skin afterward or let it air dry.  · Ask your therapy team what lotion to use and when to use it.  · Keep the treated area out of the sun. Ask your team about using a sunscreen.  · Don't remove ink marks unless your radiation therapist says you can. Dont scrub the marks when you wash. Let water run over them and pat them dry.  · Protect your skin from heat or cold. Avoid hot tubs, saunas, hot pads, and ice packs.  · Wear soft, loose clothing to avoid rubbing skin.  Fighting tiredness  The cancer itself or the radiation therapy may cause you to feel tired. Your body is working hard to heal and repair itself. To feel better:  · Try light exercise each day. Take short walks.  · Plan tasks for the times when you tend to have the most energy. Ask for help when you need it.  · Relax before you go to bed to sleep better. Try reading or listening to soothing music.  · Be sure to let your cancer care team know if you continue to have fatigue that is not getting better. They may be able to offer ways to help.   Coping with appetite changes  Tell your therapy team if you find it hard to eat or have no appetite. You may need to see a nutritionist. This is a healthcare provider with special training in meal  planning. To keep your strength up, you need to eat well and maintain your weight. Think of healthy eating as part of your treatment. Try these tips:  · Eat slowly.  · Eat small meals several times a day.  · Eat more food when youre feeling better, even if it is not mealtime.  · Ask others to keep you company when you eat.  · Stock up on easy-to-prepare foods.  · Eat foods high in protein and calories.  · Drink plenty of water and other fluids.  · Ask your healthcare provider before taking any vitamins.  Site-specific side effects  These side effects include the following:   · You may lose hair in the area being treated. The hair often grows back after treatment.  · Your mouth or throat can become dry or sore if your head or neck is being treated. Sip cool water to help ease discomfort.  · Nausea and bowel changes can happen with radiation to the pelvic region. Tell your healthcare provider if you have nausea, diarrhea, or constipation. You may need to take medicine or follow a special diet.  Talk with your healthcare team  Radiation therapy can also have other side effects, including some that might not show up until years later. Be sure to talk with your healthcare team about what to expect with the type of radiation therapy you are getting, including when you should call them with concerns.   Date Last Reviewed: 5/1/2016  © 7919-5608 [employer]. [address] [place] [address], [place] [address]. All rights reserved. This information is not intended as a substitute for professional medical care. Always follow your healthcare professional's instructions.    Return for ct/sim within the next week.

## 2018-07-03 NOTE — PROGRESS NOTES
Interdisciplinary Breast Cancer Conference    Cheyenne Flores    Female    Date Presented to Tumor Board: 07/03/18    HOSPTIAL/CLINIC PRESENTING: [facility] [address]    TUMOR SITE: RIGHT    TUMOR SITE: LOQ (one lymph node that was difficult to biopsy, PET done to examine axilla further. )    Presenter: Haylee Montemayor, Montrell Rojas, Mindi Robb (on behalf of Lachelle Hough MD)    Reason For Consultation: Initial Presentation    Specialties Present: Medical Oncology;Surgical Oncology;Pathology;Navigation;Research;Radiology    Patient Status: a current patient    Treatment to Date: Surgical Intervention(s) (lumpectomy with SLNB, mammaprint low risk)    Clinical Trial Eligibility: None available    Estrogen Receptor Status: Positive    Progesterone Status: Positive    Her2/MARKELL Status: Negative    Cancer Staging  Invasive ductal carcinoma, T1cN0   Stage IA per AJCC 8th ed. pathologic prognostic staging system      RECOMMENDED PLAN: Radiation;Endocrine Therapy   Low risk mammaprint, no need for chemotherapy    Patient may get radiation in [place], plans to move there later this summer. Will speak to rad/onc here first    UNSTAGEABLE: No         PRESENTATION AT CANCER CONFERENCE: [facility]

## 2018-07-04 ENCOUNTER — PATIENT MESSAGE (OUTPATIENT)
Dept: RADIATION ONCOLOGY | Facility: CLINIC | Age: 43
End: 2018-07-04

## 2018-07-09 ENCOUNTER — CLINICAL SUPPORT (OUTPATIENT)
Dept: REHABILITATION | Facility: HOSPITAL | Age: 43
End: 2018-07-09
Attending: SURGERY
Payer: COMMERCIAL

## 2018-07-09 DIAGNOSIS — M25.611 DECREASED RANGE OF MOTION OF RIGHT SHOULDER: ICD-10-CM

## 2018-07-09 DIAGNOSIS — M79.621 PAIN IN RIGHT UPPER ARM: ICD-10-CM

## 2018-07-09 PROCEDURE — 97140 MANUAL THERAPY 1/> REGIONS: CPT | Mod: PO | Performed by: PHYSICAL MEDICINE & REHABILITATION

## 2018-07-09 PROCEDURE — 97110 THERAPEUTIC EXERCISES: CPT | Mod: PO | Performed by: PHYSICAL MEDICINE & REHABILITATION

## 2018-07-09 NOTE — PROGRESS NOTES
"                                                    Physical Therapy Daily Treatment Note     Name: Cheyenne DORADO [facility]  Clinic Number: [id number]  Diagnosis:   Encounter Diagnoses   Name Primary?    Pain in right upper arm     Decreased range of motion of right shoulder      Physician: Lachelle Hough MD    Precautions: None  Visit #: 4 of 20  Time In: 1:00  PM  Time Out:  1:55 PM  Total Treatment Time 1:1: 55 minutes    Evaluation Date: 6/19/18  Visit # authorized: 20  Authorization period: 12/31/18  Plan of care Expiration: 7/31/18  MD referral: Lachelle Hough MD  Insurance: [facility]      Subjective     Pt reports: she is feeling better and pain and swelling in her L hand better. Patient states she is still having difficulty moving her left hand. She also reports still having pulling and tightness and pain in her R UE with terminal ROM. She is still having discomfort in her R tricep/elbow area and feels that it is swollen and but she is  able to extend her R elbow better.    Pain Scale: Cheyenne rates pain on a scale of 2/10 on VAS right upper arm/elbow and 6/10 left hand   Pain location:  Left hand, right upper arm and elbow    Fatigue: mild at end of day  Functional change: reaching higher with RUE  Treatment: Radiation pending  Surgery date:  6/8/2017 right partial mastectomy (lumpectomy) with SLNB      Objective     Cheyenne received individual therapeutic exercises to improve postural correction and alignment, stretching and soft tissue mobility, and strengthening for 40 minutes including the following:   Supine Shld Flexion with wand      1 x 10  Supine Shld ER with wand            1 x 10  Butterflies                                       10 x 5"  LTR with Butterflies                       1 x 10  Sidelying Shld Abd                         1 x 10  Sidelying Gators                            1 x10  Scap Retractions with BTB           2 x 15   Wall slides/shoulder flex/abd          2 x 10 reps " "      Shoulder Range of Motion: 7/9/18  ACTIVE ROM RIGHT   Flexion 165   Abduction 160       LANDMARK RIGHT UE LEFT UE DIFFERENCE   E + 6" 27.5 cm 26.5 cm 1 cm   E + 4" 25.5 cm 24.5 cm 1 cm   E + 2" 25 cm 23 cm 2 cm   Elbow 22.5 cm 22 cm .5 cm       Cheyenne received the following manual therapy techniques were performed to increased myofascial/soft tissue length, mobility and pliability, increase PROM, AROM and function as well as to decrease pain for 15 minutes to right shoulder, axilla and upper arm:  Bending and stretching for cording right axilla and upper arm  Right anterior and lateral chest stretch  Axillary distraction  Passive stretch all right shoulder motions      Home Exercise Program and Patient Education   Education provided re:  - role of PT in multi - disciplinary team, goals for PT  - progress towards goals     See EMR under notes/patient instructions for HEP given/taught this session - all sets and reps included. Pt received printed copy.     Pt was able to demonstrate and report understanding and performance  Pt has no cultural, educational or language barriers to learning provided.    Assessment   Pt tolerated treatment session much better today. Patient nstructed to continue with  HEP but to not perform  tricep extensions due to soreness in that area. There is some slight swelling in her R arm compared to L with measurements taken this session. Measurements of edema remained the same as last visit measurements. Pt is displaying evidence of mild axillary cording and was able to tolerate manual therapy today. Improvements noted with AROM of right shoulder.  Will continue to progress as tolerated. Pt was instructed to be more consistent with HEP.Two STG goals mets this session     Pt prognosis is Excellent.   Pt will continue to benefit from skilled outpatient physical therapy to address the deficits listed in the problem list chart on initial evaluation, provide pt/family education and to " maximize pt's level of independence in the home and community environment.     Goals as follows:  Short Term goals: 3 weeks  1. Patient will demonstrate 100% understanding of lymphedema risk reduction practices to include self monitoring for lymphedema. (progressing, not met)  2. Patient will demonstrate independence with Home Exercise program established. (progressing, not met)  3. Pt will increase AROM/PROM in shoulder abduction ROM to 140 degrees on right to improve functional reach, carry, push, pull pain free. (met 7/9/18)  4. Pt will increase AROM/PROM in shoulder flexion to 140 degrees on right to improve functional reach, carry, push, pull pain free.(progressing, not met)  5. Strength will be 4+/5 for RUE in order to perform functional activities. (progressing, not met)     Long Term Goals: 6 weeks   1.  Pt will increase AROM/PROM in shoulder flexion to 175 degrees on right to improve ability to independently dress and bathe her UE. (progressing, not met)  2. Pt will increase strength to 5/5 in gross UE musculature to improve tolerance to all functional activities pain free. (progressing, not met)  3. Pt will demonstrate full/maximized tissue mobility to increase ROM and promote healthy tissue to be pain free at discharge. (progressing, not met)  4. Pt will report no pain at discharge. (progressing, not met)  5. Pt will increase AROM/PROM in shoulder abduction ROM to 175 degrees on right to improve functional reach, carry, push, pull pain free. (progressing, not met)     Plan   Continue with established Plan of Care towards PT goals.    Therapist: Rebecca Campos, PT  7/9/2018

## 2018-07-12 ENCOUNTER — OFFICE VISIT (OUTPATIENT)
Dept: SURGERY | Facility: CLINIC | Age: 43
End: 2018-07-12
Payer: COMMERCIAL

## 2018-07-12 VITALS
BODY MASS INDEX: 20.89 KG/M2 | DIASTOLIC BLOOD PRESSURE: 79 MMHG | HEART RATE: 66 BPM | SYSTOLIC BLOOD PRESSURE: 138 MMHG | HEIGHT: 66 IN | TEMPERATURE: 98 F | WEIGHT: 130 LBS

## 2018-07-12 DIAGNOSIS — C50.911 MALIGNANT NEOPLASM OF RIGHT BREAST IN FEMALE, ESTROGEN RECEPTOR POSITIVE, UNSPECIFIED SITE OF BREAST: Primary | ICD-10-CM

## 2018-07-12 DIAGNOSIS — Z17.0 MALIGNANT NEOPLASM OF RIGHT BREAST IN FEMALE, ESTROGEN RECEPTOR POSITIVE, UNSPECIFIED SITE OF BREAST: Primary | ICD-10-CM

## 2018-07-12 PROCEDURE — 99024 POSTOP FOLLOW-UP VISIT: CPT | Mod: S$GLB,,, | Performed by: SURGERY

## 2018-07-12 PROCEDURE — 99999 PR PBB SHADOW E&M-EST. PATIENT-LVL III: CPT | Mod: PBBFAC,,, | Performed by: SURGERY

## 2018-07-13 ENCOUNTER — PATIENT MESSAGE (OUTPATIENT)
Dept: SURGERY | Facility: CLINIC | Age: 43
End: 2018-07-13

## 2018-07-14 NOTE — PROGRESS NOTES
REFERRING PHYSICIAN:  No referring provider defined for this encounter.       Laurie Marshall MD    MEDICAL ONCOLOGIST:    Fang Gutierrez MD    DIAGNOSIS:    This is a 42 y.o. female with a stage 1 pT1c N0 M0 grade 1 ER + [place] + HER2 - IDC of the right breast.    TREATMENT SUMMARY:  The patient is status post right partial mastectomy and sentinel node biopsy on 6/8/2018.  Final pathology showed negative margins, negative nodes.    INTERVAL HISTORY:   Cheyenne Flores comes in for a post-op check.  She denies fever, chills, chest pain or shortness of breath.  Her pain is well controlled.      MEDICATIONS:  Current Outpatient Prescriptions   Medication Sig Dispense Refill    fluticasone (FLONASE) 50 mcg/actuation nasal spray 1 spray by Each Nare route once daily. 16 g 3    loratadine (CLARITIN) 10 mg tablet Take 10 mg by mouth daily as needed for Allergies.       No current facility-administered medications for this visit.        ALLERGIES:   Review of patient's allergies indicates:  No Known Allergies    PHYSICAL EXAMINATION:   General:  This is a well appearing female with appropriate speech, affect and gait.     Breast:  Incisions clean, dry, and intact    IMPRESSION:   The patient has had an uneventful postoperative course.    PLAN:   1. return in 6 months for a follow up office visit and breast exam  2. bilateral mammogram in 11 months  3. The patient is advised in continued exam of the breast chest wall and to report to this office sooner should she note any areas of abnormality or concern.   4.  She has been instructed to meet with med onc and rad onc for discussion of adjuvant treatment recommendations  5.  mammaprint low risk so no chemo  6. Will do radiation in [place] (she is moving in August)

## 2018-07-23 ENCOUNTER — CLINICAL SUPPORT (OUTPATIENT)
Dept: REHABILITATION | Facility: HOSPITAL | Age: 43
End: 2018-07-23
Attending: SURGERY
Payer: COMMERCIAL

## 2018-07-23 DIAGNOSIS — M79.621 PAIN IN RIGHT UPPER ARM: ICD-10-CM

## 2018-07-23 DIAGNOSIS — M25.611 DECREASED RANGE OF MOTION OF RIGHT SHOULDER: ICD-10-CM

## 2018-07-23 PROCEDURE — 97110 THERAPEUTIC EXERCISES: CPT | Mod: PO | Performed by: PHYSICAL MEDICINE & REHABILITATION

## 2018-07-23 PROCEDURE — 97140 MANUAL THERAPY 1/> REGIONS: CPT | Mod: PO | Performed by: PHYSICAL MEDICINE & REHABILITATION

## 2018-07-23 NOTE — PROGRESS NOTES
"                                                    Physical Therapy Daily Treatment Note     Name: Cheyenne DORADO [facility]  Clinic Number: [id number]  Diagnosis:   Encounter Diagnoses   Name Primary?    Pain in right upper arm     Decreased range of motion of right shoulder      Physician: Lachelle Hough MD    Precautions: None  Visit #: 5 of 20  Time In: 9:10 AM  Time Out:  10:05 AM  Total Treatment Time 1:1: 55 minutes    Evaluation Date: 6/19/18  Visit # authorized: 20  Authorization period: 12/31/18  Plan of care Expiration: 7/31/18  MD referral: Lachelle Hough MD  Insurance: [employer]      Subjective     Pt reports: she is feeling tightness from right elbow into her right forearm. Patient states she is moving end of this week to [place] and is scheduled on 7/30/18 in [place] for her radiation appointment.  Pain Scale: Cheyenne rates pain on a scale of 6/10 on VAS right forearm/elbow /Post treatment 3/10 right elbow and upper arm  Pain location:  Left hand, right upper arm and elbow    Fatigue: mild at end of day  Functional change: reaching higher with RUE  Treatment: Radiation pending  Surgery date:  6/8/2017 right partial mastectomy (lumpectomy) with SLNB      Objective     Cheyenne received individual therapeutic exercises to improve postural correction and alignment, stretching and soft tissue mobility, and strengthening for 30 minutes including the following:   Supine Shld Flexion with wand      1 x 10  Supine Shld ER with wand            1 x 10  Butterflies                                       10 x 5"  LTR with Butterflies                       1 x 10  Sidelying Shld Abd                         1 x 10  Sidelying Gators                            1 x10  Scap Retractions with BTB           2 x 15   Wall slides/shoulder flex/abd          2 x 10 reps     Cheyenne received the following manual therapy techniques were performed to increased myofascial/soft tissue length, mobility and pliability, " "increase PROM, AROM and function as well as to decrease pain for 25 minutes to right shoulder, axilla and upper arm:  Bending and stretching for cording right axilla and upper arm and elbow  Right anterior and lateral chest stretch  Axillary distraction  Passive stretch all right shoulder motions      Shoulder Range of Motion: 7/23/18  Active /Passive ROM Right Left   Flexion 170 180   Abduction 170 180   IR/90deg 80 85   ER/90deg 90 90     Strength: manual muscle test grades below   Upper Extremity Strength: 7/23/18    (R) UE (L) UE   Shoulder flexion: 4+/5 5/5   Shoulder Abduction: 4+/5 5/5   Shoulder IR 4+/5 5/5   Shoulder ER 4+/5 5/5   Elbow flexion: 4+/5 5/5   Elbow extension: 4+/5 5/5   Wrist flexion: 5/5 5/5   Wrist extension: 5/5 5/5    5/5 5/5             LANDMARK RIGHT UE LEFT UE DIFFERENCE   E + 6" 27.5 cm 26.5 cm 1 cm   E + 4" 25.5 cm 24.5 cm 1 cm   E + 2" 25 cm 23 cm 2 cm   Elbow 22.5 cm 22 cm .5 cm           Home Exercise Program and Patient Education   Education provided re:  - role of PT in multi - disciplinary team, goals for PT  - progress towards goals     See EMR under notes/patient instructions for HEP given/taught this session - all sets and reps included. Pt received printed copy.     Pt was able to demonstrate and report understanding and performance  Pt has no cultural, educational or language barriers to learning provided.    Assessment   Reassessment for discharge as patient is moving to [place] end of this week. AROM right shoulder WNL and strength right shoulder 4+/5 range.Patient displays moderate cording right upper arm and anterior elbow with mild cording into right forearm. Patient received manual therapy as above to decrease cording and palapble "popping" noted with stretching of cording. Patient instructed in STM of cording to perform herself until she is able to establish with a therapist in [place].  Reviewed above exercise program for patient to continue on HEP and " patient is independent with HEP.Pain in RUE decreased post treatment.  Patient will be receiving radiation in [place].  Patient has met STG.  Pt prognosis is Excellent.      Goals as follows:  Short Term goals: 3 weeks  1. Patient will demonstrate 100% understanding of lymphedema risk reduction practices to include self monitoring for lymphedema. (met 7/23/18)  2. Patient will demonstrate independence with Home Exercise program established. (met, 7/23/18)  3. Pt will increase AROM/PROM in shoulder abduction ROM to 140 degrees on right to improve functional reach, carry, push, pull pain free. (met 7/9/18)  4. Pt will increase AROM/PROM in shoulder flexion to 140 degrees on right to improve functional reach, carry, push, pull pain free.(met, 7/23/18)  5. Strength will be 4+/5 for RUE in order to perform functional activities. (met, 7/23/18)     Long Term Goals: 6 weeks   1.  Pt will increase AROM/PROM in shoulder flexion to 175 degrees on right to improve ability to independently dress and bathe her UE. ( not met)  2. Pt will increase strength to 5/5 in gross UE musculature to improve tolerance to all functional activities pain free. ( not met)  3. Pt will demonstrate full/maximized tissue mobility to increase ROM and promote healthy tissue to be pain free at discharge. ( not met)  4. Pt will report no pain at discharge. ( not met)  5. Pt will increase AROM/PROM in shoulder abduction ROM to 175 degrees on right to improve functional reach, carry, push, pull pain free. ( not met)     Plan   Patient is discharged from physical therapy.    Therapist: Rebecca Campos, PT  7/23/2018

## 2018-09-17 ENCOUNTER — TELEPHONE (OUTPATIENT)
Dept: SURGERY | Facility: CLINIC | Age: 43
End: 2018-09-17

## 2018-09-17 NOTE — TELEPHONE ENCOUNTER
----- Message from Sheryl Saenz sent at 9/17/2018 12:08 PM CDT -----  Pt is calling in regards to Genetic testing and she ask me to include in this message the initials, MTHFR, please call the pt at [phone]

## 2018-09-17 NOTE — TELEPHONE ENCOUNTER
Returned the patient call regarding the message below.  Message forwarded to Ailin Santiago NP to contact the patient.

## 2018-09-18 ENCOUNTER — TELEPHONE (OUTPATIENT)
Dept: SURGERY | Facility: CLINIC | Age: 43
End: 2018-09-18

## 2018-09-18 NOTE — TELEPHONE ENCOUNTER
----- Message from Jameson Bajwa sent at 9/18/2018  8:58 AM CDT -----  Pt is returning missed call from Ailin. Pt can be reached at [phone]

## 2019-02-22 ENCOUNTER — PATIENT MESSAGE (OUTPATIENT)
Dept: SURGERY | Facility: CLINIC | Age: 44
End: 2019-02-22

## 2020-07-08 ENCOUNTER — PATIENT OUTREACH (OUTPATIENT)
Dept: ADMINISTRATIVE | Facility: HOSPITAL | Age: 45
End: 2020-07-08

## 2020-07-08 ENCOUNTER — TELEPHONE (OUTPATIENT)
Dept: FAMILY MEDICINE | Facility: CLINIC | Age: 45
End: 2020-07-08

## 2020-07-08 NOTE — TELEPHONE ENCOUNTER
----- Message from Lucero Garcia LPN sent at 7/8/2020  8:01 AM CDT -----  Regarding: Patient Moved  Good morning. Friendly reminder that I spoke with the patient and [place]. I did update the chart to reflect that.     Thanks,    Lucero SAEZ  Clinical Care Coordinator  Internal Medicine  [facility]  [phone]

## 2021-04-16 ENCOUNTER — PATIENT MESSAGE (OUTPATIENT)
Dept: RESEARCH | Facility: HOSPITAL | Age: 46
End: 2021-04-16

## (undated) DEVICE — SEE MEDLINE ITEM 157128

## (undated) DEVICE — GAUZE FLUFF XXLG 36X36 2 PLY

## (undated) DEVICE — SEE MEDLINE ITEM 152622

## (undated) DEVICE — SPONGE LAP 18X18 PREWASHED

## (undated) DEVICE — ELECTRODE REM PLYHSV RETURN 9

## (undated) DEVICE — TRAY MINOR GEN SURG

## (undated) DEVICE — SOL 0.9% NACL IRRI.IN STERIL

## (undated) DEVICE — GAUZE SPONGE 4X4 12PLY

## (undated) DEVICE — SUT 2/0 30IN SILK BLK BRAI

## (undated) DEVICE — NDL 18GA X1 1/2 REG BEVEL

## (undated) DEVICE — SYR DISP LL 5CC

## (undated) DEVICE — COVER PROBE NL STRL 3.6X96IN

## (undated) DEVICE — SUT VICRYL 3-0 27 SH

## (undated) DEVICE — PACK UNIVERSAL SPLIT II

## (undated) DEVICE — CONTAINER SPECIMEN STRL 4OZ

## (undated) DEVICE — STOCKINET 4INX48

## (undated) DEVICE — DRAPE STERI INSTRUMENT 1018

## (undated) DEVICE — SUT MCRYL PLUS 4-0 PS2 27IN

## (undated) DEVICE — ELECTRODE BLADE INSULATED 1 IN

## (undated) DEVICE — NEOGUARD COVER 4X30CM STERILE

## (undated) DEVICE — BRA SURGICAL XL 40-42

## (undated) DEVICE — ADHESIVE DERMABOND ADVANCED

## (undated) DEVICE — SEE MEDLINE ITEM 146417